# Patient Record
Sex: FEMALE | Race: WHITE | URBAN - METROPOLITAN AREA
[De-identification: names, ages, dates, MRNs, and addresses within clinical notes are randomized per-mention and may not be internally consistent; named-entity substitution may affect disease eponyms.]

---

## 2023-09-11 ENCOUNTER — TELEPHONE (OUTPATIENT)
Dept: OBGYN CLINIC | Facility: CLINIC | Age: 33
End: 2023-09-11

## 2023-09-11 NOTE — TELEPHONE ENCOUNTER
Patient is 19 weeks and 1 day pregnant and transferring care from [facility]. Patent has Medicaid [facility] that will take effect on 10/01/2023. Patient will send records through fax, please call at [phone],[email or web address].

## 2023-09-27 NOTE — TELEPHONE ENCOUNTER
ALBERTOF reviewed records and pt to come in to prenatal apt in a week.  Given to [address] staff to schedule

## 2023-10-04 ENCOUNTER — MED REC SCAN ONLY (OUTPATIENT)
Dept: OBGYN CLINIC | Facility: CLINIC | Age: 33
End: 2023-10-04

## 2023-10-09 ENCOUNTER — INITIAL PRENATAL (OUTPATIENT)
Dept: OBGYN CLINIC | Facility: CLINIC | Age: 33
End: 2023-10-09

## 2023-10-09 VITALS
BODY MASS INDEX: 29.27 KG/M2 | DIASTOLIC BLOOD PRESSURE: 66 MMHG | SYSTOLIC BLOOD PRESSURE: 100 MMHG | WEIGHT: 155 LBS | HEIGHT: 61 IN

## 2023-10-09 DIAGNOSIS — O09.33 LATE PRENATAL CARE AFFECTING PREGNANCY IN THIRD TRIMESTER: Primary | ICD-10-CM

## 2023-10-09 DIAGNOSIS — N92.6 MISSED MENSES: ICD-10-CM

## 2023-10-09 LAB
CONTROL LINE PRESENT WITH A CLEAR BACKGROUND (YES/NO): YES YES/NO
KIT LOT #: NORMAL NUMERIC
PREGNANCY TEST, URINE: POSITIVE

## 2023-10-09 NOTE — PROGRESS NOTES
Guadalupe from [place].  Had one ultrasound first trimester and per pt fhts were detected, pt was not told the due date  Pt had 1st tri screening normal , told a boy  Pt had pap last month normal per pt    Pnv taking  Ultrasound anatomy level 2 for late care  Labs w/ gtt

## 2023-10-10 ENCOUNTER — HOSPITAL ENCOUNTER (OUTPATIENT)
Dept: PERINATAL CARE | Facility: HOSPITAL | Age: 33
Discharge: HOME | End: 2023-10-10
Attending: OBSTETRICS & GYNECOLOGY
Payer: MEDICAID

## 2023-10-10 ENCOUNTER — TELEPHONE (OUTPATIENT)
Dept: OBGYN CLINIC | Facility: CLINIC | Age: 33
End: 2023-10-10

## 2023-10-10 VITALS
HEART RATE: 76 BPM | WEIGHT: 155 LBS | BODY MASS INDEX: 29 KG/M2 | DIASTOLIC BLOOD PRESSURE: 65 MMHG | SYSTOLIC BLOOD PRESSURE: 103 MMHG

## 2023-10-10 DIAGNOSIS — Z36.89 ENCOUNTER FOR FETAL ANATOMIC SURVEY: ICD-10-CM

## 2023-10-10 DIAGNOSIS — O09.32 LATE PRENATAL CARE AFFECTING PREGNANCY IN SECOND TRIMESTER: ICD-10-CM

## 2023-10-10 DIAGNOSIS — Z36.89 ENCOUNTER FOR FETAL ANATOMIC SURVEY: Primary | ICD-10-CM

## 2023-10-10 PROCEDURE — 76811 OB US DETAILED SNGL FETUS: CPT | Performed by: OBSTETRICS & GYNECOLOGY

## 2023-10-10 NOTE — TELEPHONE ENCOUNTER
Patient called in request a nurse to call patient would like to know if she need to fast for the Glu close test and fasting. Fetal ultrasound order need to be sent over . .for testing

## 2023-10-10 NOTE — PROGRESS NOTES
Pt for level 2 US  HX pt states low risk 1st tri testing  Denies pregnancy complaints  States active fetus

## 2023-10-10 NOTE — TELEPHONE ENCOUNTER
Patient verified name and     Reviewed instructions for 1 hr Glucose testing. Patient aware MFM order was placed for ultrasound. Number provided to schedule.

## 2023-10-11 ENCOUNTER — LAB ENCOUNTER (OUTPATIENT)
Dept: LAB | Facility: HOSPITAL | Age: 33
End: 2023-10-11
Attending: OBSTETRICS & GYNECOLOGY
Payer: MEDICAID

## 2023-10-11 DIAGNOSIS — O09.33 LATE PRENATAL CARE AFFECTING PREGNANCY IN THIRD TRIMESTER: ICD-10-CM

## 2023-10-11 LAB
ANTIBODY SCREEN: NEGATIVE
BASOPHILS # BLD AUTO: 0.02 X10(3) UL (ref 0–0.2)
BASOPHILS NFR BLD AUTO: 0.3 %
DEPRECATED RDW RBC AUTO: 44.1 FL (ref 35.1–46.3)
EOSINOPHIL # BLD AUTO: 0.17 X10(3) UL (ref 0–0.7)
EOSINOPHIL NFR BLD AUTO: 2.3 %
ERYTHROCYTE [DISTWIDTH] IN BLOOD BY AUTOMATED COUNT: 12.7 % (ref 11–15)
GLUCOSE 1H P GLC SERPL-MCNC: 115 MG/DL
HBV SURFACE AG SER-ACNC: <0.1 [IU]/L
HBV SURFACE AG SERPL QL IA: NONREACTIVE
HCT VFR BLD AUTO: 32.8 %
HCV AB SERPL QL IA: NONREACTIVE
HGB BLD-MCNC: 10.5 G/DL
IMM GRANULOCYTES # BLD AUTO: 0.03 X10(3) UL (ref 0–1)
IMM GRANULOCYTES NFR BLD: 0.4 %
LYMPHOCYTES # BLD AUTO: 1.08 X10(3) UL (ref 1–4)
LYMPHOCYTES NFR BLD AUTO: 14.6 %
MCH RBC QN AUTO: 30.2 PG (ref 26–34)
MCHC RBC AUTO-ENTMCNC: 32 G/DL (ref 31–37)
MCV RBC AUTO: 94.3 FL
MONOCYTES # BLD AUTO: 0.5 X10(3) UL (ref 0.1–1)
MONOCYTES NFR BLD AUTO: 6.7 %
NEUTROPHILS # BLD AUTO: 5.61 X10 (3) UL (ref 1.5–7.7)
NEUTROPHILS # BLD AUTO: 5.61 X10(3) UL (ref 1.5–7.7)
NEUTROPHILS NFR BLD AUTO: 75.7 %
PLATELET # BLD AUTO: 365 10(3)UL (ref 150–450)
RBC # BLD AUTO: 3.48 X10(6)UL
RH BLOOD TYPE: POSITIVE
RUBV IGG SER QL: POSITIVE
RUBV IGG SER-ACNC: 174.4 IU/ML (ref 10–?)
WBC # BLD AUTO: 7.4 X10(3) UL (ref 4–11)

## 2023-10-11 PROCEDURE — 87086 URINE CULTURE/COLONY COUNT: CPT

## 2023-10-11 PROCEDURE — 87491 CHLMYD TRACH DNA AMP PROBE: CPT

## 2023-10-11 PROCEDURE — 86850 RBC ANTIBODY SCREEN: CPT

## 2023-10-11 PROCEDURE — 87591 N.GONORRHOEAE DNA AMP PROB: CPT

## 2023-10-11 PROCEDURE — 87340 HEPATITIS B SURFACE AG IA: CPT

## 2023-10-11 PROCEDURE — 85025 COMPLETE CBC W/AUTO DIFF WBC: CPT

## 2023-10-11 PROCEDURE — 86762 RUBELLA ANTIBODY: CPT

## 2023-10-11 PROCEDURE — 87389 HIV-1 AG W/HIV-1&-2 AB AG IA: CPT

## 2023-10-11 PROCEDURE — 86900 BLOOD TYPING SEROLOGIC ABO: CPT

## 2023-10-11 PROCEDURE — 86780 TREPONEMA PALLIDUM: CPT

## 2023-10-11 PROCEDURE — 86803 HEPATITIS C AB TEST: CPT

## 2023-10-11 PROCEDURE — 86901 BLOOD TYPING SEROLOGIC RH(D): CPT

## 2023-10-11 PROCEDURE — 36415 COLL VENOUS BLD VENIPUNCTURE: CPT

## 2023-10-11 PROCEDURE — 82950 GLUCOSE TEST: CPT

## 2023-10-12 ENCOUNTER — OFFICE VISIT (OUTPATIENT)
Dept: INTERNAL MEDICINE CLINIC | Facility: CLINIC | Age: 33
End: 2023-10-12
Payer: MEDICAID

## 2023-10-12 VITALS
DIASTOLIC BLOOD PRESSURE: 56 MMHG | HEIGHT: 61 IN | HEART RATE: 82 BPM | TEMPERATURE: 100 F | BODY MASS INDEX: 29.27 KG/M2 | SYSTOLIC BLOOD PRESSURE: 91 MMHG | WEIGHT: 155 LBS | OXYGEN SATURATION: 98 %

## 2023-10-12 DIAGNOSIS — J06.9 URTI (ACUTE UPPER RESPIRATORY INFECTION): Primary | ICD-10-CM

## 2023-10-12 LAB
C TRACH DNA SPEC QL NAA+PROBE: NEGATIVE
N GONORRHOEA DNA SPEC QL NAA+PROBE: NEGATIVE

## 2023-10-12 PROCEDURE — 3074F SYST BP LT 130 MM HG: CPT | Performed by: INTERNAL MEDICINE

## 2023-10-12 PROCEDURE — 3008F BODY MASS INDEX DOCD: CPT | Performed by: INTERNAL MEDICINE

## 2023-10-12 PROCEDURE — 99204 OFFICE O/P NEW MOD 45 MIN: CPT | Performed by: INTERNAL MEDICINE

## 2023-10-12 PROCEDURE — 3078F DIAST BP <80 MM HG: CPT | Performed by: INTERNAL MEDICINE

## 2023-10-12 RX ORDER — IPRATROPIUM BROMIDE 42 UG/1
2 SPRAY, METERED NASAL 4 TIMES DAILY
Qty: 15 ML | Refills: 0 | Status: SHIPPED | OUTPATIENT
Start: 2023-10-12

## 2023-10-13 ENCOUNTER — TELEPHONE (OUTPATIENT)
Dept: INTERNAL MEDICINE CLINIC | Facility: CLINIC | Age: 33
End: 2023-10-13

## 2023-10-13 LAB — T PALLIDUM AB SER QL: NEGATIVE

## 2023-10-13 NOTE — TELEPHONE ENCOUNTER
First Call attempt. Left detailed message, Okay per DUSTIN, to reach out to pharmacy, they have prescription. If further questions, RN provided Office phone number with office hours. Advised check [employer] message also.            Outpatient Medication Detail     Disp Refills Start End    ipratropium 0.06 % Nasal Solution 15 mL 0 10/12/2023     Sig - Route: 2 sprays by Nasal route 4 (four) times daily. - Nasal    Sent to pharmacy as: Ipratropium Bromide 0.06 % Nasal Solution (Atrovent)    E-Prescribing Status: Receipt confirmed by pharmacy (10/13/2023  1:29 PM CDT)    Prior authorization: Approved      Associated Diagnoses    URTI (acute upper respiratory infection)  - [facility] [address] [place] [address],  [place] Celestin, [phone], [phone]

## 2023-10-13 NOTE — TELEPHONE ENCOUNTER
Prior authorization initiated for Medication:ipratropium 0.06 % Nasal Solution ,await 1-5 days for decision

## 2023-10-13 NOTE — TELEPHONE ENCOUNTER
Patient called stated had consultation yesterday and is in need for medication wondering why it shows it has arrived to the pharmacy and is unable to pick it up. Wanting to know what to do in the meanwhile. Current Outpatient Medications:     ipratropium 0.06 % Nasal Solution, 2 sprays by Nasal route 4 (four) times daily. , Disp: 15 mL, Rfl: 0

## 2023-10-14 NOTE — TELEPHONE ENCOUNTER
Prescription ordered by Dr. Pam Parisi [de-identified]  From  Abdon Cano RN To  [place] [place] and Delivered  10/13/2023  2:40 PM   Last Read in [address]  10/13/2023  4:19 PM by Zainab Kirby

## 2023-10-27 ENCOUNTER — TELEPHONE (OUTPATIENT)
Dept: OBGYN CLINIC | Facility: CLINIC | Age: 33
End: 2023-10-27

## 2023-10-27 NOTE — TELEPHONE ENCOUNTER
[place] to make November PN appt. Pt asking if November can be a virtual appt to discuss results?     Pt is thinking they will deliver in [address] advise

## 2023-10-27 NOTE — TELEPHONE ENCOUNTER
Patient verified name and     Patient 25w5d who transferred care to us is unable to make it for November appointment. Wants to know if it can be done over the phone. Discussed PN appts are done in the office. She will not be able to come into office until December. Last visit was 10/9. Discussed recommendations to be seen for routine prenatal care every 4 weeks which she would have needed a sooner appointment than November. Patient verbalized understanding and agreed. Wanted to keep appt scheduled on 11/10 for now.  [facility]

## 2023-11-17 ENCOUNTER — TELEPHONE (OUTPATIENT)
Dept: OBGYN CLINIC | Facility: CLINIC | Age: 33
End: 2023-11-17

## 2023-11-17 NOTE — TELEPHONE ENCOUNTER
Patient verified name and     Patient states she is feeling soreness around pelvic area. Patient states she has been feeling like she has been leaking fluid. States that she notices through out the day followed by clear mucus. Advised patient we would want her to be evaluated to r/o SROM. Patient states she is out of town and will not be back until the . She states she has no insurance out where she's currently at to go to ER. Enforced importance of ensuring she gets evaluated for this. Patient verbalized understanding and agreed.

## 2023-11-17 NOTE — TELEPHONE ENCOUNTER
Patient called, is experiencing pain in the groin area. Patient feels like whole body feels stiff and experiencing nausea and decreased appetite.

## 2023-11-27 ENCOUNTER — TELEPHONE (OUTPATIENT)
Dept: OBGYN CLINIC | Facility: CLINIC | Age: 33
End: 2023-11-27

## 2023-11-27 ENCOUNTER — ROUTINE PRENATAL (OUTPATIENT)
Dept: OBGYN CLINIC | Facility: CLINIC | Age: 33
End: 2023-11-27

## 2023-11-27 VITALS
HEART RATE: 91 BPM | WEIGHT: 164 LBS | SYSTOLIC BLOOD PRESSURE: 100 MMHG | BODY MASS INDEX: 31 KG/M2 | DIASTOLIC BLOOD PRESSURE: 67 MMHG

## 2023-11-27 DIAGNOSIS — Z34.83 ENCOUNTER FOR SUPERVISION OF OTHER NORMAL PREGNANCY IN THIRD TRIMESTER: Primary | ICD-10-CM

## 2023-11-27 LAB
APPEARANCE: CLEAR
BILIRUBIN: NEGATIVE
GLUCOSE (URINE DIPSTICK): NEGATIVE MG/DL
KETONES (URINE DIPSTICK): NEGATIVE MG/DL
MULTISTIX LOT#: ABNORMAL NUMERIC
NITRITE, URINE: NEGATIVE
OCCULT BLOOD: NEGATIVE
PH, URINE: 7 (ref 4.5–8)
PROTEIN (URINE DIPSTICK): NEGATIVE MG/DL
SPECIFIC GRAVITY: 1.01 (ref 1–1.03)
URINE-COLOR: YELLOW
UROBILINOGEN,SEMI-QN: 0.2 MG/DL (ref 0–1.9)

## 2023-11-27 PROCEDURE — 90471 IMMUNIZATION ADMIN: CPT | Performed by: OBSTETRICS & GYNECOLOGY

## 2023-11-27 PROCEDURE — 90715 TDAP VACCINE 7 YRS/> IM: CPT | Performed by: OBSTETRICS & GYNECOLOGY

## 2023-11-27 PROCEDURE — 99213 OFFICE O/P EST LOW 20 MIN: CPT | Performed by: OBSTETRICS & GYNECOLOGY

## 2023-11-27 PROCEDURE — 3078F DIAST BP <80 MM HG: CPT | Performed by: OBSTETRICS & GYNECOLOGY

## 2023-11-27 PROCEDURE — 81002 URINALYSIS NONAUTO W/O SCOPE: CPT | Performed by: OBSTETRICS & GYNECOLOGY

## 2023-11-27 PROCEDURE — 3074F SYST BP LT 130 MM HG: CPT | Performed by: OBSTETRICS & GYNECOLOGY

## 2023-11-27 RX ORDER — FAMOTIDINE 20 MG/1
40 TABLET, FILM COATED ORAL NIGHTLY
Qty: 60 TABLET | Refills: 5 | Status: SHIPPED | OUTPATIENT
Start: 2023-11-27

## 2023-11-27 RX ORDER — NITROFURANTOIN 25; 75 MG/1; MG/1
100 CAPSULE ORAL 2 TIMES DAILY
COMMUNITY
Start: 2023-10-21

## 2023-11-27 RX ORDER — FERROUS SULFATE 325(65) MG
325 TABLET ORAL EVERY OTHER DAY
Qty: 90 TABLET | Refills: 3 | Status: SHIPPED | OUTPATIENT
Start: 2023-11-27

## 2023-11-27 NOTE — TELEPHONE ENCOUNTER
Pt is at [facility] in [place] (on file). There is a discrepancy with prescription and won't fill until there is clarity. Medication is teraconazole 0.4 % Vaginal Cream. Please call.

## 2023-11-27 NOTE — TELEPHONE ENCOUNTER
Clarification provided to pharmacy regarding directions for teraconazole vaginal cream: Place 1 applicator vaginally nightly for 7 days.

## 2023-12-11 ENCOUNTER — ROUTINE PRENATAL (OUTPATIENT)
Dept: OBGYN CLINIC | Facility: CLINIC | Age: 33
End: 2023-12-11

## 2023-12-11 VITALS
DIASTOLIC BLOOD PRESSURE: 73 MMHG | HEART RATE: 105 BPM | BODY MASS INDEX: 32 KG/M2 | SYSTOLIC BLOOD PRESSURE: 104 MMHG | WEIGHT: 168.19 LBS

## 2023-12-11 DIAGNOSIS — Z34.83 ENCOUNTER FOR SUPERVISION OF OTHER NORMAL PREGNANCY IN THIRD TRIMESTER: Primary | ICD-10-CM

## 2023-12-11 LAB
APPEARANCE: CLEAR
BILIRUBIN: NEGATIVE
GLUCOSE (URINE DIPSTICK): NEGATIVE MG/DL
KETONES (URINE DIPSTICK): NEGATIVE MG/DL
MULTISTIX LOT#: ABNORMAL NUMERIC
NITRITE, URINE: NEGATIVE
OCCULT BLOOD: NEGATIVE
PH, URINE: 5.5 (ref 4.5–8)
PROTEIN (URINE DIPSTICK): NEGATIVE MG/DL
SPECIFIC GRAVITY: 1 (ref 1–1.03)
URINE-COLOR: YELLOW
UROBILINOGEN,SEMI-QN: 0.2 MG/DL (ref 0–1.9)

## 2023-12-11 RX ORDER — PRENATAL VIT/IRON FUM/FOLIC AC 27MG-0.8MG
1 TABLET ORAL DAILY
COMMUNITY

## 2023-12-12 ENCOUNTER — LAB ENCOUNTER (OUTPATIENT)
Dept: LAB | Facility: HOSPITAL | Age: 33
End: 2023-12-12
Attending: OBSTETRICS & GYNECOLOGY
Payer: MEDICAID

## 2023-12-12 DIAGNOSIS — Z34.83 ENCOUNTER FOR SUPERVISION OF OTHER NORMAL PREGNANCY IN THIRD TRIMESTER: ICD-10-CM

## 2023-12-12 LAB
BASOPHILS # BLD AUTO: 0.02 X10(3) UL (ref 0–0.2)
BASOPHILS NFR BLD AUTO: 0.3 %
DEPRECATED HBV CORE AB SER IA-ACNC: 21.2 NG/ML
DEPRECATED RDW RBC AUTO: 45.2 FL (ref 35.1–46.3)
EOSINOPHIL # BLD AUTO: 0.18 X10(3) UL (ref 0–0.7)
EOSINOPHIL NFR BLD AUTO: 2.7 %
ERYTHROCYTE [DISTWIDTH] IN BLOOD BY AUTOMATED COUNT: 14.8 % (ref 11–15)
GLUCOSE 1H P GLC SERPL-MCNC: 113 MG/DL
HCT VFR BLD AUTO: 31.3 %
HGB BLD-MCNC: 10.1 G/DL
IMM GRANULOCYTES # BLD AUTO: 0.05 X10(3) UL (ref 0–1)
IMM GRANULOCYTES NFR BLD: 0.7 %
LYMPHOCYTES # BLD AUTO: 1.24 X10(3) UL (ref 1–4)
LYMPHOCYTES NFR BLD AUTO: 18.3 %
MCH RBC QN AUTO: 27.7 PG (ref 26–34)
MCHC RBC AUTO-ENTMCNC: 32.3 G/DL (ref 31–37)
MCV RBC AUTO: 85.8 FL
MONOCYTES # BLD AUTO: 0.42 X10(3) UL (ref 0.1–1)
MONOCYTES NFR BLD AUTO: 6.2 %
NEUTROPHILS # BLD AUTO: 4.87 X10 (3) UL (ref 1.5–7.7)
NEUTROPHILS # BLD AUTO: 4.87 X10(3) UL (ref 1.5–7.7)
NEUTROPHILS NFR BLD AUTO: 71.8 %
PLATELET # BLD AUTO: 320 10(3)UL (ref 150–450)
RBC # BLD AUTO: 3.65 X10(6)UL
T PALLIDUM AB SER QL IA: NONREACTIVE
WBC # BLD AUTO: 6.8 X10(3) UL (ref 4–11)

## 2023-12-12 PROCEDURE — 87389 HIV-1 AG W/HIV-1&-2 AB AG IA: CPT

## 2023-12-12 PROCEDURE — 36415 COLL VENOUS BLD VENIPUNCTURE: CPT

## 2023-12-12 PROCEDURE — 86780 TREPONEMA PALLIDUM: CPT

## 2023-12-12 PROCEDURE — 85025 COMPLETE CBC W/AUTO DIFF WBC: CPT

## 2023-12-12 PROCEDURE — 82728 ASSAY OF FERRITIN: CPT

## 2023-12-12 PROCEDURE — 82950 GLUCOSE TEST: CPT

## 2023-12-27 ENCOUNTER — ROUTINE PRENATAL (OUTPATIENT)
Dept: OBGYN CLINIC | Facility: CLINIC | Age: 33
End: 2023-12-27

## 2023-12-27 VITALS — SYSTOLIC BLOOD PRESSURE: 100 MMHG | WEIGHT: 172.81 LBS | DIASTOLIC BLOOD PRESSURE: 58 MMHG | BODY MASS INDEX: 33 KG/M2

## 2023-12-27 DIAGNOSIS — Z13.89 SCREENING FOR BLOOD OR PROTEIN IN URINE: Primary | ICD-10-CM

## 2023-12-27 DIAGNOSIS — Z34.83 ENCOUNTER FOR SUPERVISION OF OTHER NORMAL PREGNANCY IN THIRD TRIMESTER: ICD-10-CM

## 2023-12-27 LAB
APPEARANCE: CLEAR
BILIRUBIN: NEGATIVE
GLUCOSE (URINE DIPSTICK): NEGATIVE MG/DL
KETONES (URINE DIPSTICK): NEGATIVE MG/DL
LEUKOCYTES: NEGATIVE
MULTISTIX LOT#: NORMAL NUMERIC
NITRITE, URINE: NEGATIVE
OCCULT BLOOD: NEGATIVE
PH, URINE: 6.5 (ref 4.5–8)
PROTEIN (URINE DIPSTICK): NEGATIVE MG/DL
SPECIFIC GRAVITY: 1.01 (ref 1–1.03)
URINE-COLOR: YELLOW
UROBILINOGEN,SEMI-QN: 0.2 MG/DL (ref 0–1.9)

## 2023-12-27 PROCEDURE — 3078F DIAST BP <80 MM HG: CPT | Performed by: OBSTETRICS & GYNECOLOGY

## 2023-12-27 PROCEDURE — 3074F SYST BP LT 130 MM HG: CPT | Performed by: OBSTETRICS & GYNECOLOGY

## 2023-12-27 PROCEDURE — 81002 URINALYSIS NONAUTO W/O SCOPE: CPT | Performed by: OBSTETRICS & GYNECOLOGY

## 2023-12-27 PROCEDURE — 99213 OFFICE O/P EST LOW 20 MIN: CPT | Performed by: OBSTETRICS & GYNECOLOGY

## 2023-12-27 NOTE — PROGRESS NOTES
Patient reports pain in left side for 3 days, feeling more tired. Pre-eclamptic precautions given. Kick Counts reviewed.   CBC, Ferritin, HIV, RPR labs ordered    Lab Results   Component Value Date    GLU1OB 113 12/12/2023

## 2024-01-12 ENCOUNTER — ROUTINE PRENATAL (OUTPATIENT)
Dept: OBGYN CLINIC | Facility: CLINIC | Age: 34
End: 2024-01-12

## 2024-01-12 VITALS
SYSTOLIC BLOOD PRESSURE: 113 MMHG | BODY MASS INDEX: 33 KG/M2 | WEIGHT: 176 LBS | HEART RATE: 82 BPM | DIASTOLIC BLOOD PRESSURE: 48 MMHG

## 2024-01-12 DIAGNOSIS — Z34.83 ENCOUNTER FOR SUPERVISION OF OTHER NORMAL PREGNANCY IN THIRD TRIMESTER: Primary | ICD-10-CM

## 2024-01-12 LAB
BILIRUBIN: NEGATIVE
GLUCOSE (URINE DIPSTICK): NEGATIVE MG/DL
KETONES (URINE DIPSTICK): NEGATIVE MG/DL
MULTISTIX LOT#: ABNORMAL NUMERIC
NITRITE, URINE: NEGATIVE
OCCULT BLOOD: NEGATIVE
PH, URINE: 7 (ref 4.5–8)
PROTEIN (URINE DIPSTICK): NEGATIVE MG/DL
SPECIFIC GRAVITY: 1.01 (ref 1–1.03)
URINE-COLOR: YELLOW
UROBILINOGEN,SEMI-QN: 0.2 MG/DL (ref 0–1.9)

## 2024-01-12 PROCEDURE — 87081 CULTURE SCREEN ONLY: CPT | Performed by: STUDENT IN AN ORGANIZED HEALTH CARE EDUCATION/TRAINING PROGRAM

## 2024-01-12 PROCEDURE — 87150 DNA/RNA AMPLIFIED PROBE: CPT | Performed by: STUDENT IN AN ORGANIZED HEALTH CARE EDUCATION/TRAINING PROGRAM

## 2024-01-12 NOTE — PROGRESS NOTES
[facility]  Obstetrics and Gynecology  Prenatal Visit  Bárbara Gonzalez PA-C    HPI   Jeannie Horton is a 33 year old.o.  36w5d weeks.  Pt is here for routine prenatal visit. No complaints or concerns.   Denies any regular uterine contractions, spontaneous rupture membranes or vaginal bleeding.  Patient feeling normal fetal movement.    OB History     OB History    Para Term  AB Living   1             SAB IAB Ectopic Multiple Live Births                  # Outcome Date GA Lbr Dom/2nd Weight Sex Delivery Anes PTL Lv   1 Current              Medications     Current Outpatient Medications   Medication Sig Dispense Refill    Prenatal 27-0.8 MG Oral Tab Take 1 tablet by mouth daily.      Ferrous Sulfate 325 (65 Fe) MG Oral Tab Take 1 tablet (325 mg total) by mouth every other day. 90 tablet 3    nitrofurantoin monohydrate macro 100 MG Oral Cap Take 1 capsule (100 mg total) by mouth 2 (two) times daily. (Patient not taking: Reported on 2023)      teraconazole 0.4 % Vaginal Cream Place 7 applicators vaginally nightly. (Patient not taking: Reported on 2023) 7 each 0    famotidine 20 MG Oral Tab Take 2 tablets (40 mg total) by mouth at bedtime. (Patient not taking: Reported on 2023) 60 tablet 5    ipratropium 0.06 % Nasal Solution 2 sprays by Nasal route 4 (four) times daily. (Patient not taking: Reported on 2023) 15 mL 0     Exam   /48   Pulse 82   Wt 176 lb (79.8 kg)   LMP 2023 (Exact Date)   BMI 33.25 kg/m²   FH: 36  FHTs: 145  Assessment   Jeannie is a 33 year old female  with viable IUP at 36w5d weeks.      ICD-10-CM    1. Encounter for supervision of other normal pregnancy in third trimester  Z34.83 POC Urinalysis, Manual Dip without microscopy [70298]        Plan   - GBBS collected  - RTC in 1 week  BÁRBARA GONZALEZ PA-C  11:28 AM  2024

## 2024-01-13 LAB — GROUP B STREP BY PCR FOR PCR OVT: POSITIVE

## 2024-01-15 ENCOUNTER — TELEPHONE (OUTPATIENT)
Dept: OBGYN CLINIC | Facility: CLINIC | Age: 34
End: 2024-01-15

## 2024-01-15 NOTE — TELEPHONE ENCOUNTER
Patient called, would like to know if Pt can receive RSV vaccine . Would also like to know at how many weeks can PT get Vaccine. Please call.

## 2024-01-15 NOTE — TELEPHONE ENCOUNTER
Patient verified name and     Patient aware of guidelines for Rsv from 32-36 weeks. Patient states she was unaware of time frame. Patient has appt on , discussed she can also touch base with provider if they are okay with her getting outside of timeframe. Verbalized understanding and agreed.

## 2024-01-17 ENCOUNTER — TELEPHONE (OUTPATIENT)
Dept: OBGYN CLINIC | Facility: CLINIC | Age: 34
End: 2024-01-17

## 2024-01-19 ENCOUNTER — ROUTINE PRENATAL (OUTPATIENT)
Dept: OBGYN CLINIC | Facility: CLINIC | Age: 34
End: 2024-01-19

## 2024-01-19 VITALS
BODY MASS INDEX: 33 KG/M2 | WEIGHT: 177 LBS | SYSTOLIC BLOOD PRESSURE: 115 MMHG | DIASTOLIC BLOOD PRESSURE: 73 MMHG | HEART RATE: 85 BPM

## 2024-01-19 DIAGNOSIS — N89.8 VAGINAL DISCHARGE: ICD-10-CM

## 2024-01-19 DIAGNOSIS — Z34.83 ENCOUNTER FOR SUPERVISION OF OTHER NORMAL PREGNANCY IN THIRD TRIMESTER: Primary | ICD-10-CM

## 2024-01-19 LAB
APPEARANCE: CLEAR
BILIRUBIN: NEGATIVE
GLUCOSE (URINE DIPSTICK): NEGATIVE MG/DL
KETONES (URINE DIPSTICK): NEGATIVE MG/DL
MULTISTIX LOT#: ABNORMAL NUMERIC
NITRITE, URINE: NEGATIVE
PH, URINE: 7 (ref 4.5–8)
PROTEIN (URINE DIPSTICK): NEGATIVE MG/DL
SPECIFIC GRAVITY: 1.02 (ref 1–1.03)
URINE-COLOR: YELLOW
UROBILINOGEN,SEMI-QN: 0.2 MG/DL (ref 0–1.9)

## 2024-01-19 PROCEDURE — 81514 NFCT DS BV&VAGINITIS DNA ALG: CPT | Performed by: STUDENT IN AN ORGANIZED HEALTH CARE EDUCATION/TRAINING PROGRAM

## 2024-01-19 NOTE — PROGRESS NOTES
[facility]  Obstetrics and Gynecology  Prenatal Visit  Mira Fink PA-C    HPI   Jeannie Horton is a 33 year old.o.  37w5d weeks.    Pt is here for routine prenatal visit. No complaints or concerns.   Denies any regular uterine contractions, spontaneous rupture membranes or vaginal bleeding.  Patient feeling normal fetal movement.    Is reporting vaginal itching. NO abnormal vaginal discharge.    OB History     OB History    Para Term  AB Living   1             SAB IAB Ectopic Multiple Live Births                  # Outcome Date GA Lbr Dom/2nd Weight Sex Delivery Anes PTL Lv   1 Current              Medications     Current Outpatient Medications   Medication Sig Dispense Refill    Prenatal 27-0.8 MG Oral Tab Take 1 tablet by mouth daily.      Ferrous Sulfate 325 (65 Fe) MG Oral Tab Take 1 tablet (325 mg total) by mouth every other day. 90 tablet 3    nitrofurantoin monohydrate macro 100 MG Oral Cap Take 1 capsule (100 mg total) by mouth 2 (two) times daily. (Patient not taking: Reported on 2023)      teraconazole 0.4 % Vaginal Cream Place 7 applicators vaginally nightly. (Patient not taking: Reported on 2023) 7 each 0    famotidine 20 MG Oral Tab Take 2 tablets (40 mg total) by mouth at bedtime. (Patient not taking: Reported on 2023) 60 tablet 5    ipratropium 0.06 % Nasal Solution 2 sprays by Nasal route 4 (four) times daily. (Patient not taking: Reported on 2023) 15 mL 0     Exam   /73   Pulse 85   Wt 177 lb (80.3 kg)   LMP 2023 (Exact Date)   BMI 33.44 kg/m²   FH: 47  FHTs: 145    Assessment   Jeannie is a 33 year old female  with viable IUP at 37w5d weeks.      ICD-10-CM    1. Encounter for supervision of other normal pregnancy in third trimester  Z34.83 POC Urinalysis, Automated Dip without microscopy (PCA and EMMG ONLY) [59264]        Plan   - Pt asked about RSV vaccine, too late to receive as it is not enough time for antibodies to cross  placenta. Baby can receive antibodies postpartum.  - Vaginosis panel obtained  - RTC in 1 week    BÁRBARA GONZALEZ PA-C  10:50 AM  1/19/2024

## 2024-01-20 LAB
BV BACTERIA DNA VAG QL NAA+PROBE: NEGATIVE
C GLABRATA DNA VAG QL NAA+PROBE: NEGATIVE
C KRUSEI DNA VAG QL NAA+PROBE: NEGATIVE
CANDIDA DNA VAG QL NAA+PROBE: POSITIVE
T VAGINALIS DNA VAG QL NAA+PROBE: NEGATIVE

## 2024-01-29 ENCOUNTER — ROUTINE PRENATAL (OUTPATIENT)
Dept: OBGYN CLINIC | Facility: CLINIC | Age: 34
End: 2024-01-29

## 2024-01-29 VITALS — WEIGHT: 176.81 LBS | DIASTOLIC BLOOD PRESSURE: 62 MMHG | SYSTOLIC BLOOD PRESSURE: 104 MMHG | BODY MASS INDEX: 33 KG/M2

## 2024-01-29 DIAGNOSIS — Z34.83 ENCOUNTER FOR SUPERVISION OF OTHER NORMAL PREGNANCY IN THIRD TRIMESTER: Primary | ICD-10-CM

## 2024-01-29 DIAGNOSIS — Z13.89 SCREENING FOR BLOOD OR PROTEIN IN URINE: ICD-10-CM

## 2024-01-29 LAB
APPEARANCE: CLEAR
BILIRUBIN: NEGATIVE
GLUCOSE (URINE DIPSTICK): NEGATIVE MG/DL
KETONES (URINE DIPSTICK): NEGATIVE MG/DL
MULTISTIX LOT#: ABNORMAL NUMERIC
NITRITE, URINE: NEGATIVE
OCCULT BLOOD: NEGATIVE
PH, URINE: 6.5 (ref 4.5–8)
PROTEIN (URINE DIPSTICK): NEGATIVE MG/DL
SPECIFIC GRAVITY: 1.01 (ref 1–1.03)
URINE-COLOR: CLEAR
UROBILINOGEN,SEMI-QN: 0.2 MG/DL (ref 0–1.9)

## 2024-01-29 NOTE — PROGRESS NOTES
Kick Counts and Labor precautions reviewed.    Discussed post dates and need for NST and appt next week   Understands if no labor 41 week IOL      Lab Results   Component Value Date    GBS Positive (A) 01/12/2024

## 2024-01-31 ENCOUNTER — TELEPHONE (OUTPATIENT)
Dept: OBGYN CLINIC | Facility: CLINIC | Age: 34
End: 2024-01-31

## 2024-02-04 ENCOUNTER — HOSPITAL ENCOUNTER (OUTPATIENT)
Facility: HOSPITAL | Age: 34
Discharge: HOME | End: 2024-02-04
Attending: OBSTETRICS & GYNECOLOGY | Admitting: OBSTETRICS & GYNECOLOGY
Payer: COMMERCIAL

## 2024-02-04 ENCOUNTER — NST DOCUMENTATION (OUTPATIENT)
Dept: OBGYN CLINIC | Facility: CLINIC | Age: 34
End: 2024-02-04

## 2024-02-04 ENCOUNTER — TELEPHONE (OUTPATIENT)
Dept: OBGYN CLINIC | Facility: CLINIC | Age: 34
End: 2024-02-04

## 2024-02-04 ENCOUNTER — APPOINTMENT (OUTPATIENT)
Dept: OBGYN CLINIC | Facility: HOSPITAL | Age: 34
End: 2024-02-04
Attending: OBSTETRICS & GYNECOLOGY
Payer: COMMERCIAL

## 2024-02-04 VITALS — SYSTOLIC BLOOD PRESSURE: 121 MMHG | DIASTOLIC BLOOD PRESSURE: 75 MMHG | HEART RATE: 110 BPM

## 2024-02-04 DIAGNOSIS — Z34.90 PREGNANCY: ICD-10-CM

## 2024-02-04 DIAGNOSIS — O48.0 POST-TERM PREGNANCY, 40-42 WEEKS OF GESTATION: Primary | ICD-10-CM

## 2024-02-04 PROCEDURE — 59025 FETAL NON-STRESS TEST: CPT | Performed by: OBSTETRICS & GYNECOLOGY

## 2024-02-04 PROCEDURE — 59025 FETAL NON-STRESS TEST: CPT

## 2024-02-04 NOTE — NST
Nonstress Test   Patient: Jeannie Horton    Gestation: 40w0d    Diagnosis from order:    ICD-10-CM    1. Post-term pregnancy, 40-42 weeks of gestation [O48.0]  O48.0                NST:        2024   NST DOCUMENTATION   Variability 6-25 BPM   Accelerations Yes   Decelerations None   Baseline 135 BPM   Uterine Irritability No   Contractions Not present   Acoustic Stimulator No   Nonstress Test Interpretation Reactive   Nonstress Test Second Interpretation Reactive   FHR Category Category I   Comments  40w0d; NST for PD   NST Completed by Sanya COLEMAN   Disposition Home    Testing Plan Weekly NST   Provider Notified Ton MILES         I agree with the above evaluation. NST completed.  Rupinder Camilo MD  2024  11:57 AM

## 2024-02-04 NOTE — PROGRESS NOTES
Pt is a 33 year old female admitted to TR1/TR1-A.     Chief Complaint   Patient presents with    Non-stress Test     PD      Pt is  40w0d intra-uterine pregnancy.  History obtained, consents signed. Oriented to room, staff, and plan of care.

## 2024-02-04 NOTE — H&P
[facility]    History and Physical Examination      Subjective   Chief Complaint:  NST post dates     HISTORY OF PRESENT ILLNESS:        Patient is a 33 year old y/o   @ 40w0d weeks presents for NST post dates . She notes + fetal movement, - vaginal bleeding, and  - ROM. Her prenatal course was uncomplicated . Her prenatal care is up to date and reviewed. Patient's GBS is  Positive     Lab Results   Component Value Date    GBS Positive (A) 2024           Past Medical History:   Diagnosis Date    Anemia        Past Surgical History:   Procedure Laterality Date    WISDOM TEETH REMOVED         OB History    Para Term  AB Living   1 0 0 0 0 0   SAB IAB Ectopic Multiple Live Births   0 0 0 0 0         No current outpatient medications on file.    No Known Allergies    Social History     Socioeconomic History    Marital status: Single     Spouse name: Not on file    Number of children: Not on file    Years of education: Not on file    Highest education level: Not on file   Occupational History    Not on file   Tobacco Use    Smoking status: Never    Smokeless tobacco: Never   Substance and Sexual Activity    Alcohol use: Not Currently     Alcohol/week: 1.0 standard drink of alcohol     Types: 1 Standard drinks or equivalent per week     Comment: socially    Drug use: Never    Sexual activity: Not on file   Other Topics Concern    Not on file   Social History Narrative    Not on file     Social Determinants of Health     Financial Resource Strain: Low Risk  (2023)    Financial Resource Strain     Difficulty of Paying Living Expenses: Somewhat hard     Med Affordability: No   Food Insecurity: No Food Insecurity (2023)    Food Insecurity     Food Insecurity: Never true   Transportation Needs: No Transportation Needs (2023)    Transportation Needs     Lack of Transportation: No   Stress: No Stress Concern Present (2023)    Stress     Feeling of Stress : No    Housing Stability: Low Risk  (11/27/2023)    Housing Stability     Housing Instability: No     Housing Instability Emergency: Not on file         History reviewed. No pertinent family history.        Prior to Admission medications    Medication Sig Start Date End Date Taking? Authorizing Provider   Prenatal 27-0.8 MG Oral Tab Take 1 tablet by mouth daily.   Yes External/Patient, Reported   Ferrous Sulfate 325 (65 Fe) MG Oral Tab Take 1 tablet (325 mg total) by mouth every other day. 11/27/23  Yes Petey Pack MD   teraconazole 0.4 % Vaginal Cream Place 7 applicators vaginally nightly.  Patient not taking: Reported on 12/11/2023 11/27/23   Petey Pack MD   famotidine 20 MG Oral Tab Take 2 tablets (40 mg total) by mouth at bedtime.  Patient not taking: Reported on 12/11/2023 11/27/23   Petey Pack MD   ipratropium 0.06 % Nasal Solution 2 sprays by Nasal route 4 (four) times daily.  Patient not taking: Reported on 12/27/2023 10/12/23   Donna Grimes MD           Objective       ROS   Constitutional: Negative.  Negative for chills and fever.   Respiratory: Negative.  Negative for shortness of breath.    Cardiovascular: Negative for chest pain and palpitations.   Gastrointestinal: Negative for diarrhea, nausea and vomiting.   Genitourinary: Negative.  Negative for dysuria.   Neurological: Negative for dizziness.       Vitals:    Pulse: 110  BP: 121/75     Physical Exam   Constitutional: She appears well-developed and well-nourished.   Cardiovascular: Normal rate.   Pulmonary/Chest: Effort normal.   Abdominal: Soft.   Genitourinary: Uterus normal.   Neurological: She is alert.   Skin: Skin is warm.   Psychiatric: She has a normal mood and affect.      .    EFM:   Baseline 140, + Accels, - Decels, Mod LT  Variability    Imogene:  CTX irregular     Ultrasound JOSE 5.3 DVP and VTX Grade 3 placenta       Lab Results   Component Value Date    ABO BLOOD TYPE O 10/11/2023    RH BLOOD TYPE Positive 10/11/2023     HGB 10.1 (L) 12/12/2023    .0 12/12/2023    HCT 31.3 (L) 12/12/2023    Rubella IgG Positive 10/11/2023    Treponemal Antibodies Nonreactive 12/12/2023    HIV Antigen Antibody Combo Non-Reactive 12/12/2023            ASSESSMENT AND PLAN:      Principal Problem:    Post-dates pregnancy        Patient seen for post dates   [email or web address] NST Reactive   Good FM noted, fetal monitoring strip reviewed and reassuring.  Plan IOL on Tuesday pm with cytotec.     Rupinder Camilo MD

## 2024-02-05 ENCOUNTER — TELEPHONE (OUTPATIENT)
Dept: OBGYN UNIT | Facility: HOSPITAL | Age: 34
End: 2024-02-05

## 2024-02-06 ENCOUNTER — HOSPITAL ENCOUNTER (INPATIENT)
Facility: HOSPITAL | Age: 34
LOS: 4 days | Discharge: HOME | End: 2024-02-10
Attending: OBSTETRICS & GYNECOLOGY | Admitting: OBSTETRICS & GYNECOLOGY
Payer: COMMERCIAL

## 2024-02-06 ENCOUNTER — APPOINTMENT (OUTPATIENT)
Dept: OBGYN CLINIC | Facility: HOSPITAL | Age: 34
End: 2024-02-06
Attending: OBSTETRICS & GYNECOLOGY
Payer: COMMERCIAL

## 2024-02-06 PROBLEM — Z34.90 PREGNANCY (HCC): Status: ACTIVE | Noted: 2024-02-06

## 2024-02-06 PROBLEM — Z34.90 PREGNANCY: Status: ACTIVE | Noted: 2024-02-06

## 2024-02-06 LAB
ANTIBODY SCREEN: NEGATIVE
BASOPHILS # BLD AUTO: 0.02 X10(3) UL (ref 0–0.2)
BASOPHILS NFR BLD AUTO: 0.3 %
DEPRECATED RDW RBC AUTO: 56.1 FL (ref 35.1–46.3)
EOSINOPHIL # BLD AUTO: 0.12 X10(3) UL (ref 0–0.7)
EOSINOPHIL NFR BLD AUTO: 1.9 %
ERYTHROCYTE [DISTWIDTH] IN BLOOD BY AUTOMATED COUNT: 18.2 % (ref 11–15)
HCT VFR BLD AUTO: 36.5 %
HGB BLD-MCNC: 11.9 G/DL
IMM GRANULOCYTES # BLD AUTO: 0.02 X10(3) UL (ref 0–1)
IMM GRANULOCYTES NFR BLD: 0.3 %
LYMPHOCYTES # BLD AUTO: 1.45 X10(3) UL (ref 1–4)
LYMPHOCYTES NFR BLD AUTO: 23.3 %
MCH RBC QN AUTO: 27.7 PG (ref 26–34)
MCHC RBC AUTO-ENTMCNC: 32.6 G/DL (ref 31–37)
MCV RBC AUTO: 84.9 FL
MONOCYTES # BLD AUTO: 0.47 X10(3) UL (ref 0.1–1)
MONOCYTES NFR BLD AUTO: 7.6 %
NEUTROPHILS # BLD AUTO: 4.13 X10 (3) UL (ref 1.5–7.7)
NEUTROPHILS # BLD AUTO: 4.13 X10(3) UL (ref 1.5–7.7)
NEUTROPHILS NFR BLD AUTO: 66.6 %
PLATELET # BLD AUTO: 275 10(3)UL (ref 150–450)
RBC # BLD AUTO: 4.3 X10(6)UL
RH BLOOD TYPE: POSITIVE
WBC # BLD AUTO: 6.2 X10(3) UL (ref 4–11)

## 2024-02-06 PROCEDURE — 3E033VJ INTRODUCTION OF OTHER HORMONE INTO PERIPHERAL VEIN, PERCUTANEOUS APPROACH: ICD-10-PCS | Performed by: OBSTETRICS & GYNECOLOGY

## 2024-02-06 RX ORDER — DEXTROSE, SODIUM CHLORIDE, SODIUM LACTATE, POTASSIUM CHLORIDE, AND CALCIUM CHLORIDE 5; .6; .31; .03; .02 G/100ML; G/100ML; G/100ML; G/100ML; G/100ML
INJECTION, SOLUTION INTRAVENOUS AS NEEDED
Status: DISCONTINUED | OUTPATIENT
Start: 2024-02-06 | End: 2024-02-07 | Stop reason: HOSPADM

## 2024-02-06 RX ORDER — SODIUM CHLORIDE, SODIUM LACTATE, POTASSIUM CHLORIDE, CALCIUM CHLORIDE 600; 310; 30; 20 MG/100ML; MG/100ML; MG/100ML; MG/100ML
INJECTION, SOLUTION INTRAVENOUS CONTINUOUS
Status: DISCONTINUED | OUTPATIENT
Start: 2024-02-06 | End: 2024-02-07 | Stop reason: HOSPADM

## 2024-02-06 RX ORDER — LIDOCAINE HYDROCHLORIDE 10 MG/ML
30 INJECTION, SOLUTION EPIDURAL; INFILTRATION; INTRACAUDAL; PERINEURAL ONCE
Status: DISCONTINUED | OUTPATIENT
Start: 2024-02-06 | End: 2024-02-07 | Stop reason: HOSPADM

## 2024-02-06 RX ORDER — ACETAMINOPHEN 500 MG
500 TABLET ORAL EVERY 6 HOURS PRN
Status: DISCONTINUED | OUTPATIENT
Start: 2024-02-06 | End: 2024-02-07 | Stop reason: HOSPADM

## 2024-02-06 RX ORDER — TERBUTALINE SULFATE 1 MG/ML
0.25 INJECTION, SOLUTION SUBCUTANEOUS AS NEEDED
Status: DISCONTINUED | OUTPATIENT
Start: 2024-02-06 | End: 2024-02-07 | Stop reason: HOSPADM

## 2024-02-06 RX ORDER — CITRIC ACID/SODIUM CITRATE 334-500MG
30 SOLUTION, ORAL ORAL AS NEEDED
Status: COMPLETED | OUTPATIENT
Start: 2024-02-06 | End: 2024-02-07

## 2024-02-06 RX ORDER — ONDANSETRON 2 MG/ML
4 INJECTION INTRAMUSCULAR; INTRAVENOUS EVERY 6 HOURS PRN
Status: DISCONTINUED | OUTPATIENT
Start: 2024-02-06 | End: 2024-02-07 | Stop reason: HOSPADM

## 2024-02-06 RX ORDER — ACETAMINOPHEN 500 MG
1000 TABLET ORAL EVERY 6 HOURS PRN
Status: DISCONTINUED | OUTPATIENT
Start: 2024-02-06 | End: 2024-02-07 | Stop reason: HOSPADM

## 2024-02-06 RX ORDER — IBUPROFEN 600 MG/1
600 TABLET ORAL ONCE AS NEEDED
Status: DISCONTINUED | OUTPATIENT
Start: 2024-02-06 | End: 2024-02-07 | Stop reason: HOSPADM

## 2024-02-07 ENCOUNTER — ANESTHESIA (OUTPATIENT)
Dept: OBGYN UNIT | Facility: HOSPITAL | Age: 34
End: 2024-02-07
Payer: COMMERCIAL

## 2024-02-07 ENCOUNTER — ANESTHESIA EVENT (OUTPATIENT)
Dept: OBGYN UNIT | Facility: HOSPITAL | Age: 34
End: 2024-02-07
Payer: COMMERCIAL

## 2024-02-07 PROCEDURE — 59514 CESAREAN DELIVERY ONLY: CPT | Performed by: OBSTETRICS & GYNECOLOGY

## 2024-02-07 RX ORDER — BUPIVACAINE HYDROCHLORIDE 2.5 MG/ML
20 INJECTION, SOLUTION EPIDURAL; INFILTRATION; INTRACAUDAL ONCE
Status: DISCONTINUED | OUTPATIENT
Start: 2024-02-07 | End: 2024-02-07

## 2024-02-07 RX ORDER — NALBUPHINE HYDROCHLORIDE 10 MG/ML
2.5 INJECTION, SOLUTION INTRAMUSCULAR; INTRAVENOUS; SUBCUTANEOUS
Status: DISCONTINUED | OUTPATIENT
Start: 2024-02-07 | End: 2024-02-07

## 2024-02-07 RX ORDER — PROCHLORPERAZINE EDISYLATE 5 MG/ML
5 INJECTION INTRAMUSCULAR; INTRAVENOUS ONCE AS NEEDED
Status: ACTIVE | OUTPATIENT
Start: 2024-02-07 | End: 2024-02-07

## 2024-02-07 RX ORDER — NALBUPHINE HYDROCHLORIDE 10 MG/ML
2.5 INJECTION, SOLUTION INTRAMUSCULAR; INTRAVENOUS; SUBCUTANEOUS EVERY 4 HOURS PRN
Status: ACTIVE | OUTPATIENT
Start: 2024-02-07 | End: 2024-02-08

## 2024-02-07 RX ORDER — DIPHENHYDRAMINE HYDROCHLORIDE 50 MG/ML
12.5 INJECTION INTRAMUSCULAR; INTRAVENOUS EVERY 4 HOURS PRN
Status: ACTIVE | OUTPATIENT
Start: 2024-02-07 | End: 2024-02-08

## 2024-02-07 RX ORDER — LIDOCAINE HYDROCHLORIDE 10 MG/ML
INJECTION, SOLUTION EPIDURAL; INFILTRATION; INTRACAUDAL; PERINEURAL AS NEEDED
Status: DISCONTINUED | OUTPATIENT
Start: 2024-02-07 | End: 2024-02-07 | Stop reason: SURG

## 2024-02-07 RX ORDER — KETOROLAC TROMETHAMINE 30 MG/ML
30 INJECTION, SOLUTION INTRAMUSCULAR; INTRAVENOUS ONCE
Status: COMPLETED | OUTPATIENT
Start: 2024-02-07 | End: 2024-02-07

## 2024-02-07 RX ORDER — LIDOCAINE HYDROCHLORIDE AND EPINEPHRINE 15; 5 MG/ML; UG/ML
INJECTION, SOLUTION EPIDURAL AS NEEDED
Status: DISCONTINUED | OUTPATIENT
Start: 2024-02-07 | End: 2024-02-07 | Stop reason: SURG

## 2024-02-07 RX ORDER — AMMONIA INHALANTS 0.04 G/.3ML
0.3 INHALANT RESPIRATORY (INHALATION) AS NEEDED
Status: DISCONTINUED | OUTPATIENT
Start: 2024-02-07 | End: 2024-02-10

## 2024-02-07 RX ORDER — CEFAZOLIN SODIUM/WATER 2 G/20 ML
2 SYRINGE (ML) INTRAVENOUS ONCE
Status: COMPLETED | OUTPATIENT
Start: 2024-02-07 | End: 2024-02-07

## 2024-02-07 RX ORDER — SODIUM CHLORIDE, SODIUM LACTATE, POTASSIUM CHLORIDE, CALCIUM CHLORIDE 600; 310; 30; 20 MG/100ML; MG/100ML; MG/100ML; MG/100ML
INJECTION, SOLUTION INTRAVENOUS CONTINUOUS
Status: DISCONTINUED | OUTPATIENT
Start: 2024-02-07 | End: 2024-02-07

## 2024-02-07 RX ORDER — ONDANSETRON 2 MG/ML
4 INJECTION INTRAMUSCULAR; INTRAVENOUS ONCE AS NEEDED
Status: ACTIVE | OUTPATIENT
Start: 2024-02-07 | End: 2024-02-07

## 2024-02-07 RX ORDER — BUPIVACAINE HYDROCHLORIDE 2.5 MG/ML
20 INJECTION, SOLUTION EPIDURAL; INFILTRATION; INTRACAUDAL ONCE
Status: DISCONTINUED | OUTPATIENT
Start: 2024-02-07 | End: 2024-02-07 | Stop reason: HOSPADM

## 2024-02-07 RX ORDER — METOCLOPRAMIDE HYDROCHLORIDE 5 MG/ML
INJECTION INTRAMUSCULAR; INTRAVENOUS
Status: DISPENSED
Start: 2024-02-07 | End: 2024-02-08

## 2024-02-07 RX ORDER — BUPIVACAINE HCL/0.9 % NACL/PF 0.25 %
5 PLASTIC BAG, INJECTION (ML) EPIDURAL AS NEEDED
Status: DISCONTINUED | OUTPATIENT
Start: 2024-02-07 | End: 2024-02-07

## 2024-02-07 RX ORDER — IBUPROFEN 600 MG/1
600 TABLET ORAL EVERY 6 HOURS
Status: DISCONTINUED | OUTPATIENT
Start: 2024-02-08 | End: 2024-02-07

## 2024-02-07 RX ORDER — IBUPROFEN 600 MG/1
600 TABLET ORAL EVERY 6 HOURS PRN
Status: DISCONTINUED | OUTPATIENT
Start: 2024-02-07 | End: 2024-02-10

## 2024-02-07 RX ORDER — GABAPENTIN 300 MG/1
300 CAPSULE ORAL EVERY 8 HOURS PRN
Status: DISCONTINUED | OUTPATIENT
Start: 2024-02-07 | End: 2024-02-10

## 2024-02-07 RX ORDER — CEFAZOLIN SODIUM/WATER 2 G/20 ML
SYRINGE (ML) INTRAVENOUS
Status: DISPENSED
Start: 2024-02-07 | End: 2024-02-08

## 2024-02-07 RX ORDER — HYDROCODONE BITARTRATE AND ACETAMINOPHEN 7.5; 325 MG/1; MG/1
2 TABLET ORAL EVERY 6 HOURS PRN
Status: DISPENSED | OUTPATIENT
Start: 2024-02-07 | End: 2024-02-08

## 2024-02-07 RX ORDER — HYDROCODONE BITARTRATE AND ACETAMINOPHEN 7.5; 325 MG/1; MG/1
1 TABLET ORAL EVERY 6 HOURS PRN
Status: DISPENSED | OUTPATIENT
Start: 2024-02-07 | End: 2024-02-08

## 2024-02-07 RX ORDER — ONDANSETRON 2 MG/ML
4 INJECTION INTRAMUSCULAR; INTRAVENOUS ONCE AS NEEDED
Status: DISCONTINUED | OUTPATIENT
Start: 2024-02-07 | End: 2024-02-07

## 2024-02-07 RX ORDER — ACETAMINOPHEN 500 MG
1000 TABLET ORAL EVERY 6 HOURS
Status: DISCONTINUED | OUTPATIENT
Start: 2024-02-07 | End: 2024-02-10

## 2024-02-07 RX ORDER — METOCLOPRAMIDE HYDROCHLORIDE 5 MG/ML
10 INJECTION INTRAMUSCULAR; INTRAVENOUS ONCE
Status: COMPLETED | OUTPATIENT
Start: 2024-02-07 | End: 2024-02-07

## 2024-02-07 RX ORDER — ONDANSETRON 2 MG/ML
4 INJECTION INTRAMUSCULAR; INTRAVENOUS EVERY 6 HOURS PRN
Status: DISCONTINUED | OUTPATIENT
Start: 2024-02-07 | End: 2024-02-10

## 2024-02-07 RX ORDER — BISACODYL 10 MG
10 SUPPOSITORY, RECTAL RECTAL ONCE AS NEEDED
Status: DISCONTINUED | OUTPATIENT
Start: 2024-02-07 | End: 2024-02-10

## 2024-02-07 RX ORDER — LIDOCAINE HCL/EPINEPHRINE/PF 2%-1:200K
VIAL (ML) INJECTION AS NEEDED
Status: DISCONTINUED | OUTPATIENT
Start: 2024-02-07 | End: 2024-02-07 | Stop reason: SURG

## 2024-02-07 RX ORDER — SIMETHICONE 80 MG
80 TABLET,CHEWABLE ORAL 3 TIMES DAILY PRN
Status: DISCONTINUED | OUTPATIENT
Start: 2024-02-07 | End: 2024-02-10

## 2024-02-07 RX ORDER — HYDROMORPHONE HYDROCHLORIDE 1 MG/ML
0.6 INJECTION, SOLUTION INTRAMUSCULAR; INTRAVENOUS; SUBCUTANEOUS
Status: ACTIVE | OUTPATIENT
Start: 2024-02-07 | End: 2024-02-08

## 2024-02-07 RX ORDER — FAMOTIDINE 10 MG/ML
INJECTION, SOLUTION INTRAVENOUS
Status: DISPENSED
Start: 2024-02-07 | End: 2024-02-08

## 2024-02-07 RX ORDER — DIPHENHYDRAMINE HCL 25 MG
25 CAPSULE ORAL EVERY 4 HOURS PRN
Status: ACTIVE | OUTPATIENT
Start: 2024-02-07 | End: 2024-02-08

## 2024-02-07 RX ORDER — HYDROMORPHONE HYDROCHLORIDE 1 MG/ML
0.4 INJECTION, SOLUTION INTRAMUSCULAR; INTRAVENOUS; SUBCUTANEOUS
Status: ACTIVE | OUTPATIENT
Start: 2024-02-07 | End: 2024-02-08

## 2024-02-07 RX ORDER — DOCUSATE SODIUM 100 MG/1
100 CAPSULE, LIQUID FILLED ORAL
Status: DISCONTINUED | OUTPATIENT
Start: 2024-02-07 | End: 2024-02-10

## 2024-02-07 RX ORDER — METOCLOPRAMIDE 10 MG/1
10 TABLET ORAL ONCE
Status: COMPLETED | OUTPATIENT
Start: 2024-02-07 | End: 2024-02-07

## 2024-02-07 RX ORDER — FAMOTIDINE 20 MG/1
20 TABLET, FILM COATED ORAL ONCE
Status: COMPLETED | OUTPATIENT
Start: 2024-02-07 | End: 2024-02-07

## 2024-02-07 RX ORDER — NALOXONE HYDROCHLORIDE 0.4 MG/ML
0.08 INJECTION, SOLUTION INTRAMUSCULAR; INTRAVENOUS; SUBCUTANEOUS
Status: ACTIVE | OUTPATIENT
Start: 2024-02-07 | End: 2024-02-08

## 2024-02-07 RX ORDER — HALOPERIDOL 5 MG/ML
0.5 INJECTION INTRAMUSCULAR ONCE AS NEEDED
Status: ACTIVE | OUTPATIENT
Start: 2024-02-07 | End: 2024-02-07

## 2024-02-07 RX ORDER — ACETAMINOPHEN 325 MG/1
650 TABLET ORAL EVERY 6 HOURS PRN
Status: ACTIVE | OUTPATIENT
Start: 2024-02-07 | End: 2024-02-08

## 2024-02-07 RX ORDER — FAMOTIDINE 10 MG/ML
20 INJECTION, SOLUTION INTRAVENOUS ONCE
Status: COMPLETED | OUTPATIENT
Start: 2024-02-07 | End: 2024-02-07

## 2024-02-07 RX ADMIN — SODIUM CHLORIDE, SODIUM LACTATE, POTASSIUM CHLORIDE, CALCIUM CHLORIDE: 600; 310; 30; 20 INJECTION, SOLUTION INTRAVENOUS at 13:36:00

## 2024-02-07 RX ADMIN — LIDOCAINE HYDROCHLORIDE 5 ML: 10 INJECTION, SOLUTION EPIDURAL; INFILTRATION; INTRACAUDAL; PERINEURAL at 07:54:00

## 2024-02-07 RX ADMIN — CEFAZOLIN SODIUM/WATER 2 G: 2 G/20 ML SYRINGE (ML) INTRAVENOUS at 13:41:00

## 2024-02-07 RX ADMIN — SODIUM CHLORIDE, SODIUM LACTATE, POTASSIUM CHLORIDE, CALCIUM CHLORIDE: 600; 310; 30; 20 INJECTION, SOLUTION INTRAVENOUS at 14:35:00

## 2024-02-07 RX ADMIN — LIDOCAINE HYDROCHLORIDE AND EPINEPHRINE 3 ML: 15; 5 INJECTION, SOLUTION EPIDURAL at 08:00:00

## 2024-02-07 RX ADMIN — LIDOCAINE HCL/EPINEPHRINE/PF 15 ML: 2%-1:200K VIAL (ML) INJECTION at 13:36:00

## 2024-02-07 NOTE — ANESTHESIA PROCEDURE NOTES
Labor Analgesia    Date/Time: 2/7/2024 7:52 AM    Performed by: Nell Simpson MD  Authorized by: Nell Simpson MD      General Information and Staff    Start Time:  2/7/2024 7:52 AM  End Time:  2/7/2024 8:06 AM  Anesthesiologist:  Nell Simpson MD  Performed by:  Anesthesiologist  Patient Location:  OB  Site Identification: surface landmarks    Reason for Block: labor epidural    Preanesthetic Checklist: patient identified, IV checked, site marked, risks and benefits discussed, monitors and equipment checked, pre-op evaluation, timeout performed, anesthesia consent and sterile technique used      Procedure Details    Patient Position:  Sitting  Prep: ChloraPrep    Monitoring:  Heart rate  Approach:  Midline    Epidural Needle    Injection Technique:  BAUDILIO air  Needle Type:  Tuohy  Needle Gauge:  18 G  Needle Length:  3.5 in  Needle Insertion Depth:  5  Location:  L2-3    Spinal Needle      Catheter    Catheter Type:  Multi-orifice  Catheter Size:  20 G  Catheter at Skin Depth:  11  Test Dose:  Negative    Assessment    Sensory Level:  T10    Additional Comments

## 2024-02-07 NOTE — ANESTHESIA PREPROCEDURE EVALUATION
Anesthesia PreOp Note    HPI:     Jeannie Horton is a 33 year old female who presents for preoperative consultation requested by: * No surgeons listed *    Date of Surgery: 2024    * No procedures listed *  Indication: * No pre-op diagnosis entered *    Relevant Problems   No relevant active problems       NPO:                         History Review:  Patient Active Problem List    Diagnosis Date Noted    Pregnancy 2024    Post-dates pregnancy 2024       Past Medical History:   Diagnosis Date    Anemia        Past Surgical History:   Procedure Laterality Date    WISDOM TEETH REMOVED         Medications Prior to Admission   Medication Sig Dispense Refill Last Dose    [] mupirocin 2 % External Ointment Apply topically. (Patient not taking: Reported on 2023)       Prenatal 27-0.8 MG Oral Tab Take 1 tablet by mouth daily.       Ferrous Sulfate 325 (65 Fe) MG Oral Tab Take 1 tablet (325 mg total) by mouth every other day. 90 tablet 3      Current Facility-Administered Medications Ordered in Epic   Medication Dose Route Frequency Provider Last Rate Last Admin    fentaNYL-bupivacaine 2 mcg/mL-0.125% in sodium chloride 0.9% 100 mL EPIDURAL infusion premix   Epidural Continuous Chago Haas MD        fentaNYL (Sublimaze) 50 mcg/mL injection 100 mcg  100 mcg Epidural Once Chago Haas MD        bupivacaine PF (Marcaine) 0.25% injection  20 mL Epidural Once Chago Haas MD        EPHEDrine (PF) 25 MG/5 ML injection 5 mg  5 mg Intravenous PRN Chago Haas MD        nalbuphine (Nubain) 10 mg/mL injection 2.5 mg  2.5 mg Intravenous Q15 Min PRN Chago Haas MD        acetaminophen (Tylenol Extra Strength) tab 500 mg  500 mg Oral Q6H PRN Seamus Chi MD        acetaminophen (Tylenol Extra Strength) tab 1,000 mg  1,000 mg Oral Q6H PRN Seamus Chi MD        ibuprofen (Motrin) tab 600 mg  600 mg Oral Once PRN Seamus Chi MD        ondansetron (Zofran) 4 MG/2ML  injection 4 mg  4 mg Intravenous Q6H PRN Seamus Chi MD   4 mg at 02/07/24 0549    oxyTOCIN in sodium chloride 0.9% (Pitocin) 30 Units/500mL infusion premix  62.5-900 grupo-units/min Intravenous Continuous Seamus Chi MD        terbutaline (Brethine) 1 MG/ML injection 0.25 mg  0.25 mg Subcutaneous PRN Seamus Chi MD        sodium citrate-citric acid (Bicitra) 500-334 MG/5ML oral solution 30 mL  30 mL Oral PRN Seamus Chi MD        lidocaine PF (Xylocaine-MPF) 1% injection  30 mL Intradermal Once Seamus Chi MD        lactated ringers infusion   Intravenous Continuous Seamus Chi MD   Paused at 02/07/24 0549    dextrose in lactated ringers 5% infusion   Intravenous PRN Seamus Chi MD   Stopped at 02/07/24 0626    lactated ringers IV bolus 500 mL  500 mL Intravenous PRN Seamus Chi MD   Stopped at 02/07/24 0705    fentaNYL (Sublimaze) 50 mcg/mL injection 50 mcg  50 mcg Intravenous Q30 Min PRN Seamus Chi MD   50 mcg at 02/07/24 0653    penicillin G potassium 3 Million Units in sodium chloride 0.9% 100 mL IVPB  3 Million Units Intravenous Q4H Seamus Chi  mL/hr at 02/07/24 0532 3 Million Units at 02/07/24 0532    misoprostol (CYTOTEC) partial tablets 25 mcg  25 mcg Vaginal Q4H Seamus Chi MD   25 mcg at 02/07/24 0305     No current Epic-ordered outpatient medications on file.       No Known Allergies    No family history on file.  Social History     Socioeconomic History    Marital status: Single   Tobacco Use    Smoking status: Never    Smokeless tobacco: Never   Substance and Sexual Activity    Alcohol use: Not Currently     Alcohol/week: 1.0 standard drink of alcohol     Types: 1 Standard drinks or equivalent per week     Comment: socially    Drug use: Never       Available pre-op labs reviewed.  Lab Results   Component Value Date    WBC 6.2 02/06/2024    RBC 4.30 02/06/2024    HGB 11.9 (L) 02/06/2024    HCT 36.5  02/06/2024    MCV 84.9 02/06/2024    MCH 27.7 02/06/2024    MCHC 32.6 02/06/2024    RDW 18.2 (H) 02/06/2024    .0 02/06/2024             Vital Signs:  Body mass index is 34.01 kg/m².   height is 1.549 m (5' 1\") and weight is 81.6 kg (180 lb). Her oral temperature is 98.8 °F (37.1 °C). Her blood pressure is 123/63 and her pulse is 66. Her respiration is 16.   Vitals:    02/07/24 0530 02/07/24 0759 02/07/24 0801 02/07/24 0803   BP: 119/73 129/69 105/58 123/63   Pulse: 67 72 106 66   Resp: 16      Temp: 98.8 °F (37.1 °C)      TempSrc: Oral      Weight:       Height:            Anesthesia Evaluation      Airway   Mallampati: I  TM distance: >3 FB  Neck ROM: full  Dental      Pulmonary - normal exam   Cardiovascular - normal exam    Neuro/Psych      GI/Hepatic/Renal      Endo/Other    Abdominal   (+) obese                 Anesthesia Plan:   ASA:  2  Emergent    Plan:   Epidural  Informed Consent Plan and Risks Discussed With:  Patient      I have informed Jeannie Sanfordrera and/or legal guardian or family member of the nature of the anesthetic plan, benefits, risks including possible dental damage if relevant, major complications, and any alternative forms of anesthetic management.   All of the patient's questions were answered to the best of my ability. The patient desires the anesthetic management as planned.  JAYLENE TAY MD  2/7/2024 8:04 AM  Present on Admission:  **None**

## 2024-02-07 NOTE — PROGRESS NOTES
Patient transferred to mother/baby room 368 per cart in stable condition with baby and personal belongings.  Accompanied by  and staff.  Report given to mother/baby RN Dawson.

## 2024-02-07 NOTE — ANESTHESIA POSTPROCEDURE EVALUATION
Patient: Jeannie Horton    Procedure Summary       Date: 02/07/24 Room / Location:     Anesthesia Start: 0752 Anesthesia Stop:     Procedure: LABOR ANALGESIA Diagnosis:     Scheduled Providers:  Anesthesiologist: Nell Simpson MD    Anesthesia Type: epidural ASA Status: 2 - Emergent            Anesthesia Type: epidural    Vitals Value Taken Time   /95 02/07/24 1435   Temp 99.3 °F (37.4 °C) 02/07/24 1435   Pulse 95 02/07/24 1435   Resp 16 02/07/24 1435   SpO2 99 % 02/07/24 1435       [facility] AN Post Evaluation:   Patient Evaluated in PACU  Patient Participation: complete - patient participated  Level of Consciousness: awake  Pain Management: adequate  Airway Patency:patent  Dental exam unchanged from preop  Yes    Cardiovascular Status: acceptable  Respiratory Status: acceptable  Postoperative Hydration acceptable      NELL SIMPSON MD  2/7/2024 2:38 PM

## 2024-02-07 NOTE — H&P
[facility]    History & Physical    Jeannie Horton Patient Status:  Inpatient    1990 MRN [id number]   Location [facility] Attending Seamus Chi MD   Hosp Day # 0 PCP No primary care provider on file.     Date of Admission:  2024      HPI:   Jeannie Horton is a 33 year old  female, current EGA of 40w2d with an estimated date of delivery of: Estimated Date of Delivery: 24      Jeannie Horton is being admitted for induction of labor.    Her current obstetrical history is significant for  postdates .    Patient reports good fetal movement .     Fetal Movement: normal.     History   Obstetric History:   OB History    Para Term  AB Living   1             SAB IAB Ectopic Multiple Live Births                  # Outcome Date GA Lbr Dom/2nd Weight Sex Delivery Anes PTL Lv   1 Current              Past Medical History:   Past Medical History:   Diagnosis Date    Anemia      Past Social History:   Past Surgical History:   Procedure Laterality Date    WISDOM TEETH REMOVED       Family History: No family history on file.  Social History:   Social History     Tobacco Use    Smoking status: Never    Smokeless tobacco: Never   Substance Use Topics    Alcohol use: Not Currently     Alcohol/week: 1.0 standard drink of alcohol     Types: 1 Standard drinks or equivalent per week     Comment: socially        Allergies/Medications:   Allergies:   No Known Allergies  Medications:  Medications Prior to Admission   Medication Sig Dispense Refill Last Dose    [] mupirocin 2 % External Ointment Apply topically. (Patient not taking: Reported on 2023)       Prenatal 27-0.8 MG Oral Tab Take 1 tablet by mouth daily.       Ferrous Sulfate 325 (65 Fe) MG Oral Tab Take 1 tablet (325 mg total) by mouth every other day. 90 tablet 3        Review of Systems:   As documented in HPI    good fetal movement    Physical Exam:   Temp:  [99.4 °F (37.4 °C)] 99.4  °F (37.4 °C)  Pulse:  [81] 81  Resp:  [16] 16  BP: (116)/(63) 116/63    Constitutional: alert, appears stated age, and cooperative  Respiratory: clear to auscultation bilaterally  Cardiac: regular rate and rhythm, S1, S2 normal, no murmur, click, rub or gallop  Abdomen: FHT present  Fetal Surveillance:  reactive nst,  cat 1    Cervix:  FT rn exam.  Cytotec ordered for IOL.     Results:     Lab Results   Component Value Date    TREPONEMALAB Nonreactive 2023    ABO O 10/11/2023    RH Positive 10/11/2023    WBC 6.2 2024    HGB 11.9 (L) 2024    HCT 36.5 2024    .0 2024       Lab Results   Component Value Date    SPECGRAVITY 1.010 2024    NITRITE Negative 2024       No results found.      Assessment/Plan:    Jeannie Horton is at an estimated gestational age of 40w2d with an estimated date of delivery of:  Estimated Date of Delivery: 24    Not in labor.  Obstetrical history significant for  postdates .    Admission problem(s):    Pregnancy  32 yo  at 40 2/7 weeks scheduled for IOL for postdates.  Gbs pos,  pcn ordered.            Risks, benefits, alternatives and possible complications have been discussed in detail with the patient.   Pre-admission, admission, and post admission procedures and expectations were discussed in detail.    All questions answered, all appropriate consents will be signed at the Hospital. Admission is planned for today.   Continue present management..    Seamus Chi MD  2024  9:31 PM

## 2024-02-07 NOTE — PLAN OF CARE

## 2024-02-07 NOTE — L&D DELIVERY NOTE
[facility]     Section Delivery Note    Jeannie Horton Patient Status:  Inpatient    1990 MRN [id number]   Location [facility] Attending Seamus Chi MD   Hosp Day # 1 PCP No primary care provider on file.     Pre Op Diagnosis: arrest of dilitation and non-reassuring fetal well being  Post Op Diagnosis: same as pre-op  Procedure:   Surgical/Procedural Cases on this Admission       Case IDs Date Procedure Surgeon Location Status    [id number]  SECTION Seamus Chi MD [facility] L+D OR [facility]          LT    Surgeon:  Seamus Chi MD  Assistant Surgeon:  john   Anesthesia: epidural  Complications: none  Neonatologist Present: yes  Findings: normal uterus    Delivery     Infant  Date of Delivery: 2024    Time of Delivery: 1:53 PM   Delivery Type: Caesarean Section     Infant Sex/Birthweight: male 9 lb 7.3 oz (4.29 kg)     Presentation Vertex [1]   Position          Apgars:  1 minute: 9                5 minutes: 9                         10 minutes:      Placenta  Date/Time of Delivery: 2024  1:55 PM    Delivery: spontaneous  Placenta to Pathology: yes  Cord Gases Submitted: yes  Cord Blood Collection: yes  Cord Tissue Collection:no   Cord Complications: none  Sponge and Needle Counts:  Verified: yes  Delivery Comment:  infant pink crying and active.    Dictation Number:  pending.    Input/Output   EBL:  700 ml  Delivery Fluids:  LR pending.   Urine Output: pending. ml  Urine Color: clear    Seamus Chi MD  2024  4:03 PM

## 2024-02-07 NOTE — PROGRESS NOTES
Pt is a 33 year old female admitted to 375/375-A.     Chief Complaint   Patient presents with    Scheduled Induction     Post dates      Pt is  40w2d intra-uterine pregnancy.  History obtained, consents signed. Oriented to room, staff, and plan of care.

## 2024-02-08 LAB
BASOPHILS # BLD AUTO: 0.03 X10(3) UL (ref 0–0.2)
BASOPHILS NFR BLD AUTO: 0.4 %
DEPRECATED RDW RBC AUTO: 59.2 FL (ref 35.1–46.3)
EOSINOPHIL # BLD AUTO: 0.09 X10(3) UL (ref 0–0.7)
EOSINOPHIL NFR BLD AUTO: 1.1 %
ERYTHROCYTE [DISTWIDTH] IN BLOOD BY AUTOMATED COUNT: 18.8 % (ref 11–15)
HCT VFR BLD AUTO: 28.4 %
HGB BLD-MCNC: 9.6 G/DL
IMM GRANULOCYTES # BLD AUTO: 0.03 X10(3) UL (ref 0–1)
IMM GRANULOCYTES NFR BLD: 0.4 %
LYMPHOCYTES # BLD AUTO: 1.41 X10(3) UL (ref 1–4)
LYMPHOCYTES NFR BLD AUTO: 17.4 %
MCH RBC QN AUTO: 29.5 PG (ref 26–34)
MCHC RBC AUTO-ENTMCNC: 33.8 G/DL (ref 31–37)
MCV RBC AUTO: 87.4 FL
MONOCYTES # BLD AUTO: 0.5 X10(3) UL (ref 0.1–1)
MONOCYTES NFR BLD AUTO: 6.2 %
NEUTROPHILS # BLD AUTO: 6.06 X10 (3) UL (ref 1.5–7.7)
NEUTROPHILS # BLD AUTO: 6.06 X10(3) UL (ref 1.5–7.7)
NEUTROPHILS NFR BLD AUTO: 74.5 %
PLATELET # BLD AUTO: 184 10(3)UL (ref 150–450)
RBC # BLD AUTO: 3.25 X10(6)UL
WBC # BLD AUTO: 8.1 X10(3) UL (ref 4–11)

## 2024-02-08 RX ORDER — MELATONIN
325
Status: CANCELLED | OUTPATIENT
Start: 2024-02-08

## 2024-02-08 NOTE — PLAN OF CARE
Problem: POSTPARTUM  Goal: Long Term Goal:Experiences normal postpartum course  Description: INTERVENTIONS:  - Assess and monitor vital signs and lab values.  - Assess fundus and lochia.  - Provide ice/sitz baths for perineum discomfort.  - Monitor healing of incision/episiotomy/laceration, and assess for signs and symptoms of infection and hematoma.  - Assess bladder function and monitor for bladder distention.  - Provide/instruct/assist with pericare as needed.  - Provide VTE prophylaxis as needed.  - Monitor bowel function.  - Encourage ambulation and provide assistance as needed.  - Assess and monitor emotional status and provide social service/psych resources as needed.  - Utilize standard precautions and use personal protective equipment as indicated. Ensure aseptic care of all intravenous lines and invasive tubes/drains.  - Obtain immunization and exposure to communicable diseases history.  Outcome: Progressing  Goal: Optimize infant feeding at the breast  Description: INTERVENTIONS:  - Initiate breast feeding within first hour after birth.   - Monitor effectiveness of current breast feeding efforts.  - Assess support systems available to mother/family.  - Identify cultural beliefs/practices regarding lactation, letdown techniques, maternal food preferences.  - Assess mother's knowledge and previous experience with breast feeding.  - Provide information as needed about early infant feeding cues (e.g., rooting, lip smacking, sucking fingers/hand) versus late cue of crying.  - Discuss/demonstrate breast feeding aids (e.g., infant sling, nursing footstool/pillows, and breast pumps).  - Encourage mother/other family members to express feelings/concerns, and actively listen.  - Educate father/SO about benefits of breast feeding and how to manage common lactation challenges.  - Recommend avoidance of specific medications or substances incompatible with breast feeding.  - Assess and monitor for signs of nipple  pain/trauma.  - Instruct and provide assistance with proper latch.  - Review techniques for milk expression (breast pumping) and storage of breast milk. Provide pumping equipment/supplies, instructions and assistance, as needed.  - Encourage rooming-in and breast feeding on demand.  - Encourage skin-to-skin contact.  - Provide LC support as needed.  - Assess for and manage engorgement.  - Provide breast feeding education handouts and information on community breast feeding support.   Outcome: Progressing  Goal: Establishment of adequate milk supply with medication/procedure interruptions  Description: INTERVENTIONS:  - Review techniques for milk expression (breast pumping).   - Provide pumping equipment/supplies, instructions, and assistance until it is safe to breastfeed infant.  Outcome: Progressing  Goal: Experiences normal breast weaning course  Description: INTERVENTIONS:  - Assess for and manage engorgement.  - Instruct on breast care.  - Provide comfort measures.  Outcome: Progressing  Goal: Appropriate maternal -  bonding  Description: INTERVENTIONS:  - Assess caregiver- interactions.  - Assess caregiver's emotional status and coping mechanisms.  - Encourage caregiver to participate in  daily care.  - Assess support systems available to mother/family.  - Provide /case management support as needed.  Outcome: Progressing     Problem: GASTROINTESTINAL - ADULT  Goal: Minimal or absence of nausea and vomiting  Description: INTERVENTIONS:  - Maintain adequate hydration with IV or PO as ordered and tolerated  - Nasogastric tube to low intermittent suction as ordered  - Evaluate effectiveness of ordered antiemetic medications  - Provide nonpharmacologic comfort measures as appropriate  - Advance diet as tolerated, if ordered  - Obtain nutritional consult as needed  - Evaluate fluid balance  Outcome: Progressing  Goal: Maintains or returns to baseline bowel function  Description:  INTERVENTIONS:  - Assess bowel function  - Maintain adequate hydration with IV or PO as ordered and tolerated  - Evaluate effectiveness of GI medications  - Encourage mobilization and activity  - Obtain nutritional consult as needed  - Establish a toileting routine/schedule  - Consider collaborating with pharmacy to review patient's medication profile  Outcome: Progressing  Goal: Maintains adequate nutritional intake (undernourished)  Description: INTERVENTIONS:  - Monitor percentage of each meal consumed  - Identify factors contributing to decreased intake, treat as appropriate  - Assist with meals as needed  - Monitor I&O, WT and lab values  - Obtain nutritional consult as needed  - Optimize oral hygiene and moisture  - Encourage food from home; allow for food preferences  - Enhance eating environment  Outcome: Progressing  Goal: Achieves appropriate nutritional intake (bariatric)  Description: INTERVENTIONS:  - Monitor for over-consumption  - Identify factors contributing to increased intake, treat as appropriate  - Monitor I&O, WT and lab values  - Obtain nutritional consult as needed  - Evaluate psychosocial factors contributing to over-consumption  Outcome: Progressing

## 2024-02-08 NOTE — OPERATIVE REPORT
[facility]    PATIENT'S NAME: DEB SALEH   ATTENDING PHYSICIAN: Seamus Chi MD   OPERATING PHYSICIAN: Seamus Chi MD   PATIENT ACCOUNT#:   [id number]    LOCATION:  [address] [place] [address]  MEDICAL RECORD #:   [id number]       YOB: 1990  ADMISSION DATE:       2024      OPERATION DATE:  2024    OPERATIVE REPORT      PREOPERATIVE DIAGNOSIS:  Arrest of dilation and nonreassuring fetal well-being.  POSTOPERATIVE DIAGNOSIS:  Arrest of dilation and nonreassuring fetal well-being.  PROCEDURE:  Primary low transverse  section.    ASSISTANT SURGEON:  Trinity Mathis MD.    ANESTHESIA:  Epidural, Dr. Simpson.    ESTIMATED BLOOD LOSS:  700 mL.     URINE OUTPUT:  Clear yellow urine.  Amount pending.    COMPLICATIONS:  None.    CONDITION:  Patient tolerated the procedure well and was taken to the PAR in good condition.    INDICATIONS:  Patient is a 33-year-old female, induction of labor for postdates, who was subsequently noted to have no cervical change.  In addition, the patient was noted to have decreased/minimal variability and decelerations.  The patient was counseled on options of management including continued induction of labor versus a primary low transverse  section, and the patient considered her options with her .  The patient at this time has declined any further labor and requested to proceed with primary low transverse  section.  The risks, benefits, and alternatives counseled including the risk of bleeding; infection; damage to internal organs (including bowel, bladder, uterus, ovaries, tubes, cervix, vagina, ureters, blood vessels, among other organs); risk of anesthesia; risk of thromboembolic disease such as DVT, PE, MI, stroke; risk of hemorrhage and blood transfusion; among other risks.  The patient voiced understanding of all the above, and all questions were answered.    FINDINGS:  Delivered a viable male infant,  weighing 4290 g.  Apgars 9 at one minute and 9 at five minutes.    OPERATIVE TECHNIQUE:  The patient was taken to the operating room and after epidural anesthesia was bolused, she was prepped and draped in usual sterile fashion.  The patient received antibiotic prophylaxis prior to her procedure.  Sequential compression devices were started prior to procedure, were operative throughout the procedure, and will be operative postop as well for DVT prophylaxis.  Testing revealed excellent epidural anesthesia.  A knife was used to perform a Pfannenstiel skin incision which was carried down through the subcutaneous tissue to the fascia.  The fascia was incised in the middle.  Incision extended laterally.  Fascia was raised off the rectus muscles.  Rectus muscles were  in the midline.  Peritoneum was entered.  Peritoneal incision was extended.  The bladder flap was made, and the bladder blade was replaced.  A knife was used to perform a low transverse uterine incision which was extended laterally using the digital method.  Clear fluid noted on entry into the intrauterine cavity.  The infant was delivered in cephalic presentation without difficulty and bulb suctioned on the maternal abdomen.  Then, 30 seconds of delayed cord clamping was performed per Neonatology, after which the cord was clamped and cut.  The infant was taken to warmer in good condition.  Cord pH and cord bloods were obtained.  The placenta was delivered intact and sent to Pathology.  Uterus, ovaries, and tubes were exteriorized and after it was assured that all clots and membranes were removed from the uterine cavity, uterine incision was closed with 0 Vicryl suture in a running interlocking stitch, followed by 0 Vicryl suture in a running imbricating stitch.  In addition, figure-of-eight suture was placed in the midline of the incision.  Excellent hemostasis noted at this time.  Copious irrigation was performed in all quadrants.  Uterus,  ovaries, and tubes were grossly normal and were replaced intraperitoneally once again and irrigation was performed.  Excellent hemostasis noted once again.  All counts were correct.  The rectus muscles were hemostatic.  The fascia was closed with #1 Vicryl x2 and excellent closure noted.  Subcutaneous tissue was irrigated.  Excellent hemostasis noted.  All counts were correct.  The skin was closed with 3-0 Vicryl Rapide suture in a subcuticular stitch, followed by benzoin and Steri-Strips.  A pressure dressing was applied.  All counts were correct.  The patient tolerated the procedure well and was taken to PAR in good condition.     Dictated By Seamus Chi MD  d: 02/07/2024 16:11:53  t: 02/08/2024 01:08:32  Job [id number]  AAS/

## 2024-02-08 NOTE — LACTATION NOTE
This note was copied from a baby's chart.  LACTATION NOTE - INFANT    Evaluation Type  Evaluation Type: Inpatient    Problems & Assessment  Problems Diagnosed or Identified: Shallow latch  Infant Assessment: Hunger cues present  Muscle tone: Appropriate for GA    Feeding Assessment  Summary Current Feeding: Breastfeeding exclusively  Breastfeeding Assessment: Assisted with breastfeeding w/mother's permission;Sustained nutrititive latch w/audible swallows;Coordinated suck/swallow;Tolerated feeding well;Deep latch achieved and observed  Breastfeeding lasted # of minutes: 15  Breastfeeding Positions: left breast;right breast;football  Latch: Grasps breast, tongue down, lips flanged, rhythmic sucking  Audible Sucks/Swallows: Spontaneous and intermittent (24 hours old)  Type of Nipple: Everted (after stimulation)  Comfort (Breast/Nipple): Soft/non-tender  Hold (Positioning): Full assist, teach one side, mother does other, staff holds  LATCH Score: 9

## 2024-02-08 NOTE — PROGRESS NOTES
POD 1    Pt doing well.  No c/o.     Alert and oriented nad  Abd soft nontender fundus firm, dressing c/d/I  Ext nt    Hb 9.6    A/P POD 1  pt doing well.  No c/o.   Abd binder.

## 2024-02-08 NOTE — LACTATION NOTE
LACTATION NOTE - MOTHER      Evaluation Type: Inpatient    Problems identified  Problems identified: Knowledge deficit    Maternal history  Maternal history: Caesarean section    Breastfeeding goal  Breastfeeding goal: To maintain breast milk feeding per patient goal    Maternal Assessment  Bilateral Breasts: Wide spaced;Soft  Bilateral Nipples: Slightly everted/short;Colostrum easily expressed  Prior breastfeeding experience (comment below): Primip  Prior BF experience: comment: breastfeeding class  Breastfeeding Assistance: Breastfeeding assistance provided with permission    Pain assessment  Location/Comment: denies  Treatment of Sore Nipples: Deeper latch techniques    Guidelines for use of:  Suggested use of pump: Pump each time a supplement is offered  Other (comment): Assisted with positioning in football hold, demonstrated technique for achieving deeper latch. Mom able to return demonstration. Answered questions r/t pumping, bottle feeding and formula supplement, which was given for 1 feeding. Pumping deferred at this time as mom plans on exclusively breastfeeding. Discussed cluster-feeding patterns, early hunger cues and signs of adequate intake.

## 2024-02-08 NOTE — CM/SW NOTE
SW self referral due to finances/WIC resources    SW met with patient and FOB  bedside.  SW confirmed face sheet contact as correct.    Baby boy/girl name:Susana Murphy  Date & time of delivery:2024 @ 1:53pm  Delivery method: section  Siblings age:n/a    Patient employed: Denied  Length of maternity leave:n/a    Father of baby employed:Yes  Length of paternity leave:Denied    Breast or formula feed:Breast feed    Pediatrician:TBD  SW encouraged pt to schedule infant first appointment (usually within 48 hours of discharge) prior to pt discharge. Pt expressed understanding.     Infant Insurance:Medicaid  [employer] [facility] contacted:Yes    Mental Health History: Denied    Medications:n/a    Therapist:n/a    Psychiatrist:n/a    CARLTON discussed signs, symptoms and risks associated with post partum depression & anxiety.  SW provided pt with PMAD resources.  Other resources provided:[employer] transportation and mental health resources.  [facility] specific resources.  Pt endorses she is current w/WIC services and was encouraged to contact them informing of infants birth.  Pt expressed understanding.     Patient support system:FOB and extended family    Patient denied current questions/needs from SW.    SW/CM to remain available for support and/or discharge planning.      Minna Pandya, MSW, LSW  Social Work   Ext:#[phone]

## 2024-02-08 NOTE — ANESTHESIA POST-OP FOLLOW-UP NOTE
Ms. Jeannie Horton is POD#1 after her  performed under neuraxial anesthesia. Denies headache, back pain, or residual LE weakness/numbness.  Spinal placement site examined, no erythema, hematoma, or tenderness to palpation.   Pain score is 2/10.   No further anesthesia management needed at this time. Doing well on oral pain meds.     All anesthetic questions answered.     Ella Talley MD

## 2024-02-09 NOTE — PROGRESS NOTES
POD 2     Pt doing well.  No c/o.      Alert and oriented nad  Abd soft nontender fundus firm, dressing c/d/I  Ext nt     Hb 9.6     A/P POD 2  pt doing well.  No c/o.   Abd binder.

## 2024-02-09 NOTE — LACTATION NOTE
This note was copied from a baby's chart.     02/09/24 1130   Evaluation Type   Evaluation Type Inpatient   Problems & Assessment   Problems Diagnosed or Identified Shallow latch   Infant Assessment Hunger cues present   Muscle tone Appropriate for GA   Feeding Assessment   Summary Current Feeding Breastfeeding exclusively   Breastfeeding Assessment Assisted with breastfeeding w/mother's permission;Sustained nutrititive latch w/audible swallows;Calm and ready to breastfeed;Coordinated suck/swallow;Tolerated feeding well;Deep latch achieved and observed   Breastfeeding lasted # of minutes 15   Breastfeeding Positions football;right breast;left breast   Latch 1   Audible Sucks/Swallows 2   Type of Nipple 2   Comfort (Breast/Nipple) 2   Hold (Positioning) 1   LATCH Score 8   Other (comment) Mom states that she now has nipple pain, nipples are red, has been using lanolin and not getting better, gelpads given, assisted with a latch in footbal hold to both sides, deep latch was attained with the use of pillows and deep latch techniques, encouraged to call if needed.

## 2024-02-09 NOTE — LACTATION NOTE
02/09/24 1030   Evaluation Type   Evaluation Type Inpatient   Problems identified   Problems identified Knowledge deficit;Nipple pain   Maternal history   Maternal history Caesarean section;Induction of labor;Anemia   Breastfeeding goal   Breastfeeding goal To maintain breast milk feeding per patient goal   Maternal Assessment   Bilateral Breasts Wide spaced;Soft   Bilateral Nipples Slightly everted/short;Colostrum easily expressed;Elastic;Sore   Prior breastfeeding experience (comment below) Primip   Prior BF experience: comment breastfeeding class   Breastfeeding Assistance Breastfeeding assistance provided with permission   Pain assessment   Pain, additional Pain location   Pain Location Nipples   Treatment of Sore Nipples Deeper latch techniques;Expressed breast milk;Hydrogel dressings as directed;Lanolin   Guidelines for use of:   Equipment Hydrogel dressings   Breast pump type Spectra   Current use of pump: not currently used   Suggested use of pump Pump each time a supplement is offered   Other (comment) Mom states that she now has nipple pain, nipples are red, has been using lanolin and not getting better, gelpads given, assisted with a latch in footbal hold to both sides, deep latch was attained with the use of pillows and deep latch techniques, encouraged to call if needed.

## 2024-02-10 VITALS
OXYGEN SATURATION: 98 % | HEIGHT: 61 IN | RESPIRATION RATE: 15 BRPM | BODY MASS INDEX: 33.99 KG/M2 | SYSTOLIC BLOOD PRESSURE: 116 MMHG | TEMPERATURE: 99 F | HEART RATE: 61 BPM | WEIGHT: 180 LBS | DIASTOLIC BLOOD PRESSURE: 54 MMHG

## 2024-02-10 RX ORDER — IBUPROFEN 600 MG/1
600 TABLET ORAL EVERY 6 HOURS PRN
Qty: 40 TABLET | Refills: 0 | Status: SHIPPED | OUTPATIENT
Start: 2024-02-10

## 2024-02-10 RX ORDER — GABAPENTIN 300 MG/1
300 CAPSULE ORAL EVERY 8 HOURS PRN
Qty: 12 CAPSULE | Refills: 0 | Status: SHIPPED | OUTPATIENT
Start: 2024-02-10

## 2024-02-10 RX ORDER — ACETAMINOPHEN 500 MG
500 TABLET ORAL EVERY 6 HOURS PRN
Qty: 40 TABLET | Refills: 1 | Status: SHIPPED | OUTPATIENT
Start: 2024-02-10

## 2024-02-10 RX ORDER — PSEUDOEPHEDRINE HCL 30 MG
100 TABLET ORAL 2 TIMES DAILY PRN
Qty: 30 CAPSULE | Refills: 1 | Status: SHIPPED | OUTPATIENT
Start: 2024-02-10

## 2024-02-10 NOTE — LACTATION NOTE
This note was copied from a baby's chart.     02/10/24 0900   Evaluation Type   Evaluation Type Inpatient   Problems & Assessment   Infant Assessment Hunger cues present   Muscle tone Appropriate for GA   Feeding Assessment   Summary Current Feeding Breastfeeding exclusively   Breastfeeding Assessment Assisted with breastfeeding w/mother's permission;Deep latch achieved and observed;Calm and ready to breastfeed;Tolerated feeding well;Coordinated suck/swallow;Sustained nutrititive latch w/audible swallows   Breastfeeding lasted # of minutes 10  (still nursing)   Breastfeeding Positions football;left breast   Latch 1   Audible Sucks/Swallows 2   Type of Nipple 2   Comfort (Breast/Nipple) 2   Hold (Positioning) 2   LATCH Score 9   Other (comment) Mom breastfeeding when entered the room, infant in deep latch, football hold to left side, mom states that infant has been nursing well, nipple pain about the same with lanolin, gel pads given and discussed, couplet to go home today, encouraged to call if needed.

## 2024-02-10 NOTE — LACTATION NOTE
02/10/24 0900   Evaluation Type   Evaluation Type Inpatient   Problems identified   Problems identified Knowledge deficit;Nipple pain   Maternal history   Maternal history Caesarean section;Induction of labor;Anemia   Breastfeeding goal   Breastfeeding goal To maintain breast milk feeding per patient goal   Maternal Assessment   Bilateral Breasts Wide spaced;Soft   Bilateral Nipples Colostrum easily expressed;Elastic;Slightly everted/short;Sore   Prior breastfeeding experience (comment below) Primip   Prior BF experience: comment breastfeeding class   Breastfeeding Assistance Breastfeeding assistance provided with permission   Pain assessment   Pain, additional Pain location   Pain Location Nipples   Location/Comment both nipples   Treatment of Sore Nipples Hydrogel dressings as directed;Lanolin;Deeper latch techniques;Expressed breast milk   Guidelines for use of:   Equipment Hydrogel dressings;Lanolin   Breast pump type Spectra   Current use of pump: not currently used   Suggested use of pump Pump each time a supplement is offered   Other (comment) Mom breastfeeding when entered the room, infant in deep latch, football hold to left side, mom states that infant has been nursing well, nipple pain about the same with lanolin, gel pads given and discussed, couplet to go home today,  encouraged to call if needed.

## 2024-02-10 NOTE — PLAN OF CARE
Problem: POSTPARTUM  Goal: Long Term Goal:Experiences normal postpartum course  Description: INTERVENTIONS:  - Assess and monitor vital signs and lab values.  - Assess fundus and lochia.  - Provide ice/sitz baths for perineum discomfort.  - Monitor healing of incision/episiotomy/laceration, and assess for signs and symptoms of infection and hematoma.  - Assess bladder function and monitor for bladder distention.  - Provide/instruct/assist with pericare as needed.  - Provide VTE prophylaxis as needed.  - Monitor bowel function.  - Encourage ambulation and provide assistance as needed.  - Assess and monitor emotional status and provide social service/psych resources as needed.  - Utilize standard precautions and use personal protective equipment as indicated. Ensure aseptic care of all intravenous lines and invasive tubes/drains.  - Obtain immunization and exposure to communicable diseases history.  Outcome: Completed  Goal: Optimize infant feeding at the breast  Description: INTERVENTIONS:  - Initiate breast feeding within first hour after birth.   - Monitor effectiveness of current breast feeding efforts.  - Assess support systems available to mother/family.  - Identify cultural beliefs/practices regarding lactation, letdown techniques, maternal food preferences.  - Assess mother's knowledge and previous experience with breast feeding.  - Provide information as needed about early infant feeding cues (e.g., rooting, lip smacking, sucking fingers/hand) versus late cue of crying.  - Discuss/demonstrate breast feeding aids (e.g., infant sling, nursing footstool/pillows, and breast pumps).  - Encourage mother/other family members to express feelings/concerns, and actively listen.  - Educate father/SO about benefits of breast feeding and how to manage common lactation challenges.  - Recommend avoidance of specific medications or substances incompatible with breast feeding.  - Assess and monitor for signs of nipple  pain/trauma.  - Instruct and provide assistance with proper latch.  - Review techniques for milk expression (breast pumping) and storage of breast milk. Provide pumping equipment/supplies, instructions and assistance, as needed.  - Encourage rooming-in and breast feeding on demand.  - Encourage skin-to-skin contact.  - Provide LC support as needed.  - Assess for and manage engorgement.  - Provide breast feeding education handouts and information on community breast feeding support.   Outcome: Completed  Goal: Establishment of adequate milk supply with medication/procedure interruptions  Description: INTERVENTIONS:  - Review techniques for milk expression (breast pumping).   - Provide pumping equipment/supplies, instructions, and assistance until it is safe to breastfeed infant.  Outcome: Completed  Goal: Experiences normal breast weaning course  Description: INTERVENTIONS:  - Assess for and manage engorgement.  - Instruct on breast care.  - Provide comfort measures.  Outcome: Completed  Goal: Appropriate maternal -  bonding  Description: INTERVENTIONS:  - Assess caregiver- interactions.  - Assess caregiver's emotional status and coping mechanisms.  - Encourage caregiver to participate in  daily care.  - Assess support systems available to mother/family.  - Provide /case management support as needed.  Outcome: Completed     Problem: GASTROINTESTINAL - ADULT  Goal: Minimal or absence of nausea and vomiting  Description: INTERVENTIONS:  - Maintain adequate hydration with IV or PO as ordered and tolerated  - Nasogastric tube to low intermittent suction as ordered  - Evaluate effectiveness of ordered antiemetic medications  - Provide nonpharmacologic comfort measures as appropriate  - Advance diet as tolerated, if ordered  - Obtain nutritional consult as needed  - Evaluate fluid balance  Outcome: Completed  Goal: Maintains or returns to baseline bowel function  Description:  INTERVENTIONS:  - Assess bowel function  - Maintain adequate hydration with IV or PO as ordered and tolerated  - Evaluate effectiveness of GI medications  - Encourage mobilization and activity  - Obtain nutritional consult as needed  - Establish a toileting routine/schedule  - Consider collaborating with pharmacy to review patient's medication profile  Outcome: Completed  Goal: Maintains adequate nutritional intake (undernourished)  Description: INTERVENTIONS:  - Monitor percentage of each meal consumed  - Identify factors contributing to decreased intake, treat as appropriate  - Assist with meals as needed  - Monitor I&O, WT and lab values  - Obtain nutritional consult as needed  - Optimize oral hygiene and moisture  - Encourage food from home; allow for food preferences  - Enhance eating environment  Outcome: Completed  Goal: Achieves appropriate nutritional intake (bariatric)  Description: INTERVENTIONS:  - Monitor for over-consumption  - Identify factors contributing to increased intake, treat as appropriate  - Monitor I&O, WT and lab values  - Obtain nutritional consult as needed  - Evaluate psychosocial factors contributing to over-consumption  Outcome: Completed

## 2024-02-10 NOTE — DISCHARGE SUMMARY
[facility]    Discharge Summary    Jeannie Horton Patient Status:  Inpatient    1990 MRN [id number]   Location [facility] 3SE Attending Seamus Chi MD   Hosp Day # 4 PCP No primary care provider on file.     Admit Date: 2024    Discharge Date : 2/10/24    Attending Physician: Seamus Chi MD    Reason for Admission:  pregnancy    Procedures:  Primary low transverse  section.     Hospital Course: arrest of dilation/fetal intolerance to labor,  pod 3  pt doing well  eating well , ambulating well, good pain control. Pt requested discharge home today.       Discharge Diagnoses: There were no encounter diagnoses.    Discharged Condition: good    Disposition: home    Alert and oriented nad  Abd soft, nontender, fundus firm.  Incision c/d/I  Ext nt    Discharge counseling done     Patient Instructions:  Follow-up appointment with office in 2 weeks.       Seamus Chi MD  2/10/2024  10:04 AM

## 2024-02-12 NOTE — PAYOR COMM NOTE
--------------  DISCHARGE REVIEW    Payor: [facility]  Subscriber #:  [id number]  Authorization Number: [id number]    Admit date: 24  Admit time:   7:53 PM  Discharge Date: 2/10/2024  1:18 PM     Admitting Physician: Seamus Chi MD  Attending Physician:  No att. providers found  Primary Care Physician: No primary care provider on file.          Discharge Summary Notes        Discharge Summary signed by Seamus Chi MD at 2/10/2024 10:10 AM       Author: Seamus Chi MD Specialty: OBSTETRICS & GYNECOLOGY Author Type: Physician    Filed: 2/10/2024 10:10 AM Date of Service: 2/10/2024 10:04 AM Status: Signed    : Seamus Chi MD (Physician)           [facility]    Discharge Summary    Jeannie Horton Patient Status:  Inpatient    1990 MRN [id number]   Location [facility] 3SE Attending Seamus Chi MD   Hosp Day # 4 PCP No primary care provider on file.     Admit Date: 2024    Discharge Date : 2/10/24    Attending Physician: Seamus Chi MD    Reason for Admission:  pregnancy    Procedures:  Primary low transverse  section.     Hospital Course: arrest of dilation/fetal intolerance to labor,  pod 3  pt doing well  eating well , ambulating well, good pain control. Pt requested discharge home today.       Discharge Diagnoses: There were no encounter diagnoses.    Discharged Condition: good    Disposition: home    Alert and oriented nad  Abd soft, nontender, fundus firm.  Incision c/d/I  Ext nt    Discharge counseling done     Patient Instructions:  Follow-up appointment with office in 2 weeks.       Seamus Chi MD  2/10/2024  10:04 AM    Electronically signed by Seamus Chi MD on 2/10/2024 10:10 AM         REVIEWER COMMENTS

## 2024-02-21 ENCOUNTER — TELEPHONE (OUTPATIENT)
Dept: OBGYN CLINIC | Facility: CLINIC | Age: 34
End: 2024-02-21

## 2024-02-21 NOTE — TELEPHONE ENCOUNTER
Pt Name and  verified.  Pt delivered on . States that she has been getting chills and not sure if it could be related to her breastfeeding. Pt states that she was engorged and was able to alleviate some of that feeling but is still concerned. Unable to come in today. Advised to go to ED if symptoms worsen. Scheduled with CLAYTON tomorrow in office.

## 2024-02-21 NOTE — TELEPHONE ENCOUNTER
Pt delivered 2/7 has yellow snot coming out of nose & she is breastfeeding. Pt would like to discuss

## 2024-02-22 ENCOUNTER — OFFICE VISIT (OUTPATIENT)
Dept: OBGYN CLINIC | Facility: CLINIC | Age: 34
End: 2024-02-22

## 2024-02-22 VITALS
HEART RATE: 82 BPM | SYSTOLIC BLOOD PRESSURE: 103 MMHG | TEMPERATURE: 98 F | DIASTOLIC BLOOD PRESSURE: 67 MMHG | WEIGHT: 156 LBS | BODY MASS INDEX: 29 KG/M2

## 2024-02-22 DIAGNOSIS — O92.79: Primary | ICD-10-CM

## 2024-02-22 PROCEDURE — 99213 OFFICE O/P EST LOW 20 MIN: CPT | Performed by: STUDENT IN AN ORGANIZED HEALTH CARE EDUCATION/TRAINING PROGRAM

## 2024-02-26 ENCOUNTER — NURSE ONLY (OUTPATIENT)
Dept: LACTATION | Facility: HOSPITAL | Age: 34
End: 2024-02-26
Attending: OBSTETRICS & GYNECOLOGY
Payer: COMMERCIAL

## 2024-02-26 PROCEDURE — 99213 OFFICE O/P EST LOW 20 MIN: CPT

## 2024-02-26 NOTE — PROGRESS NOTES
SITUATION:    Jeannie & baby Jeffrey here for initial outpt appt, mom seen last week in OB with concerns of mastitis, and  she is having trouble with positioning    Background:    HX: Jeffrey Dennis has been mostly breast feeding since home, in the last week she needed to start doing some pumping, as latching was becoming challenging and having engorgement pain. She had some chills & body aches with very full breasts. OB did not prescribe any meds, and told to come back if s/s got worse. S/s got better, and today only c/o breasts seeming full, but not painful.  In the last couple of days, she is mostly on demand breast feeding pumping up to 3 times a day with spectra pump, and getting about 120 ml ave per pump session. Jeffrey last night was a little more fussy and trouble with latching, so was given pumped breast milk via bottle.     BW: 9 LB 7.3 oz  Last wt: 10 LB 7 oz  Todays wt: 10 LB 4.7 oz (4670 g)     Void/Stool: adequate    Maternal Considerations:  Jeannie still having some discomfort from her C/S, and having a hard time finding a position that is not painful for her incision.     ASSESSMENT:    Jeannie was assisted with getting into the laid back position. She states has a hard time feeding on the right side. Once positioned well, Jeffrey was able to latch with minimal help from LC. He had a good deep latch, only popped once in the beginning, and Jeannie was able to reach latch with no assistance. He had lots of active sucks and swallows, and good sucking patter. He latched well on both sides. Once Jeannie shown how to position with the pillows and adjust her hold, she was able to latch with no help on the 2nd side. He latched for about 13 minutes a side, and latched well and good active swallows on both sides. Mom shown breast compression to keep him active at the breast. Reviewed settings for the spectra, as she was only pumping in the expression phase. Jeannie states sometimes Jeffrey is gagging/chocking at the breast,  and discussed different adjustments to prevent/ help with this.     TOTAL MILK TRANSFERRED:   104 ml     RECOMMENDATION:    Jeffrey transferred great in the laid back position. Breast fed on demand 8-12 times a day. If breasts are too firm before a feeding session, can either heat & massage to soften, or pump for a couple of minutes to relieve the pressure with the hand pump. Try to avoid too much, breast feeding & pumping, but can pump in place of a feeding session. Adjust the pump settings to start 70/5 - for 1- 2 minutes, and then switch settings to the expression mode- with trying a slower cycle and higher suction. Reviewed how to use the hand pump.  If milk flow while breast feeding is too fast, take baby off and have cloth on breast to slow flow for a few minutes. Or try block feeding, latch on one side for a few feedings, and try to leave other side alone, and only do minimal pumping if have to for comfort, and then switch and few feedings on 2nd side only, to help regulate supply.  Jeffrey transferred milo today in the laid back hold, try having a pillow on either side in the chair, & your leaned back & he is diagonal across your body.     Follow up with Lactation: prn  Follow up with Pediatrician as recommended

## 2024-02-26 NOTE — PATIENT INSTRUCTIONS
PLAN FOR HOME:     Jeffrey pedraza in the laid back position. Breast fed on demand 8-12 times a day. If breasts are too firm before a feeding session, can either heat & massage to soften, or pump for a couple of minutes to relieve the pressure with the hand pump. Try to avoid too much breast feeding & pumping, which can lead to over supply. But can pump in place of a feeding session. Adjust the pump settings to start 70/5 - for 1- 2 minutes, and then switch settings to the expression mode- with trying a slower cycle and higher suction.      If milk flow while breast feeding is too fast, take baby off and have cloth on breast to slow flow for a few minutes.     Block feeding- can help if you feel he is really struggling with keeping up with the flow of your milk. You would latch on one side for a few feedings, and try to leave other side alone, and only do minimal pumping if have to for comfort, and then switch and few feedings on 2nd side only, to help regulate supply.  Jeffrey pedraza today in the laid back hold, try having a pillow on either side in the chair, & your leaned back & he is diagonal across your body.       ADDITIONAL INFORMATION:     Snuggle your baby in skin to skin contact between and during feedings whenever possible.    Massage your breasts before nursing or pumping to soften areola if needed.    Breastfeed with hunger cues: Most babies will breastfeed 8-12 times every 24 hours with some clustered breastfeeding, especially during growth spurts.     Positioning:   Baby facing mom with mouth at nipple level. Bring infant to the breast not the breast to the infant.  Make sure infant is fully turned towards you with head aligned with the shoulders for latch and arms hugging you.  Baby should approach breast reaching up with the chin lifted.  Chin is deep into the breast and nose is slightly away from breast after latch.  Make small adjustments in position for your comfort and  to help make space for the infant's nose if needed.  Use laid back position.    Deep Latching on:  Express drops of milk onto your baby’s lips to encourage latching if needed.  Aim your nipple to baby’s nose  Wait for a wide mouth and push your nipple in the mouth as you bring infant fully onto the breast.  Observe for mouth \"planted\" on the breast and for good jaw movement with suck.    Is baby taking enough breast milk?  Swallowing with most sucks (every 1-3 sucks) until satisfied at least 8-12 times every 24 hours.  Compressing the breast when your baby sucks can increase milk flow.  At least 6-8 wet diapers and at least 3-4 soft, yellow seedy stools every 24 hours. Use the breastfeeding journal to keep a record.   Weight gain of at least 4-7 ounces per week for the first 3 months after return to birth weight.      Hour of Age  Intake (mL/feed)-   if doing bottle only feeding  1st 24 hours 2-10  24-48 hours 5-15  48-72 hours 15-30  72-96 hours 30-60  Day 10: 2-3 ounces per feeding.  4 weeks: 3-4 ounces or more per feeding.    Paced bottle feeding using a slow flow nipple:     Hold your baby in an upright position, supporting the hand and neck with your hand, rather than in the crook of your arm.   Let your baby “latch on” to the bottle: stroke nipple down from top lip to bottom, licking is good, wait for wide mouth and insert nipple with lips on base.  Angle the bottle so flow is slower. If the bottle is vertical milk will flow to quickly.  Pausing mimics breastfeeding and discourages “guzzling” the feeding.        Remember the helpful website to improve your production that helps https://med.[place].edu/newborns/professional-education/breastfeeding/maximizing-milk-production.html    Breastfeeding Journal:  Write down your baby’s feedings and diapers - if not meeting the guideline for number of diapers or feedings, call your baby’s doctor.      Follow up with your OB healthcare provider:  Call if a plugged duct  or engorgement persists greater than 48 hours. Call if firm or reddened spots are present in breast with signs of fever, chills or flu like symptoms (possible breast infection/mastitis). Check your temperature during engorgement.      Care for nipples until healed:     Express drops of breast milk on nipples before and after nursing (unless nipple thrush is present).  Use a hydrogel type dressing on your nipples between feedings. (Soothies or Ameda Comfort Gel pads)  Or, use Lanolin every time after breastfeeding. (Do not combine with use of gel pads)  If too sore to nurse on one or both breasts, pump one (or both) breast(s) to comfort every 2-3 hours. If nursing to contentment on one breast, this pumped milk can be stored for future use. If not nursing on either breast, feed baby your breast milk until able to return to breast.   Discuss use of all purpose nipple ointment with your OB doctor.   Call doctor if nipple has signs of infection: red/deep pink, drainage (pus), increased pain, fever.     Follow-up:  Call lactation [phone] as needed. Schedule follow-up lactation consult as needed.   Appointment with baby’s doctor planned.  Attend Mommy and Baby Support Group.  (check website www.[email or web address].org under classes or call class registration at [phone])  Call your baby's doctor with questions as well      Call your OB doctor with any signs of   mastitis (breast infection)    Plugged area(s) are not soft within 24 hours.   Breast becomes firm, reddened, or painful.   Fever, chills, or flu-like symptoms. Check your temperature 3 times daily until a plugged duct or mastitis resolves.    If mastitis occurs:  Continue breastfeeding and/or pumping - your milk is not infected.   Continue above treatment for relieving plugged area(s)  Continue to take antibiotic as prescribed even though you may quickly feel better.   Contact your doctor is you are not feeling significantly better within 1-2 days of starting  antibiotic, sooner if symptoms worsen.      Call the lactation consultants at [phone] as needed.       [facility] has great support for our families even after discharge.  We have virtual or in-person support groups.  Visit our website for the most up-to-date info for our many different support groups. [email or web address]/services/[email or web address]-[email or web address]/resources/       Outpatient Lactation appoints.  Call [phone]- to schedule an appt.  Our office is located in the Maternal Fetal Medicine office next to [facility] on the first floor.      New Moms Support Groups  Our weekly New Mom Support Groups are for any new parents in our community. They are led by an experienced Mother/Baby nurse or IBCLC and usually include a guest speaker on a topic of interest to new parents. These in-person groups also include Breastfeeding Support at each meeting. Bring your baby ( - 6 months) with you! Moms-to-be are also welcome! All mom's welcome even if its not your first.     MOM & BABY HOUR   Meets most  10:00 - 11:30 a.m.  Masks are not required, but be considerate of others and do not attend if mom or baby have had any symptoms of illness within the previous 24 hours. Breastfeeding support will be included at each session--just ask the leader any breastfeeding questions you may have. Location [facility] [place] [address] [place] Go inside the front door and to the right to the “Community Education Room”.    Mom's Line: [phone]   This service is provided by [facility], has a phone line dedicated women (or anyone worried about a women) who may be experiencing signs or symptoms of postpartum depression.    Nurturing Mom- A support group for new and expectant moms looking for support with the transition to parenthood as well as those experiencing symptoms of  anxiety and/or depression.  Please contact  [email or web address] if you need directions or the link for the virtual meetings. Please contact [email or web address] if you plan to attend, but please be considerate of others and do not attend if mom or baby have had any symptoms of illness within the previous 24 hours.     La L[employer] League for breast feeding and parent support, Website: [employer].com  and for the [place] group and other groups visit https://www.facebook.com/pg/Marshal/events/.  to help find a group, all meetings are virtual.     Facebook groups-  for more support when home- Babies & Mommies [facility] --- you can find mom-to-mom advice and the list of speaker topics for cradle talk program.     Helpful websites:    www.llli.org  [email or web address]  [email or web address]

## 2024-03-04 ENCOUNTER — POSTPARTUM (OUTPATIENT)
Dept: OBGYN CLINIC | Facility: CLINIC | Age: 34
End: 2024-03-04

## 2024-03-04 VITALS
WEIGHT: 152 LBS | BODY MASS INDEX: 28.7 KG/M2 | SYSTOLIC BLOOD PRESSURE: 110 MMHG | HEIGHT: 61 IN | DIASTOLIC BLOOD PRESSURE: 76 MMHG

## 2024-03-04 NOTE — PROGRESS NOTES
HPI:   Jeannie Horton is a 33 year old female who presents for a pp visit counseled on contraception options.     Wt Readings from Last 6 Encounters:   03/04/24 152 lb (68.9 kg)   02/22/24 156 lb (70.8 kg)   02/06/24 180 lb (81.6 kg)   01/29/24 176 lb 12.8 oz (80.2 kg)   01/19/24 177 lb (80.3 kg)   01/12/24 176 lb (79.8 kg)     Body mass index is 28.72 kg/m².     No results found for: \"CHOLEST\", \"HDL\", \"LDL\", \"TRIGLY\", \"AST\", \"ALT\"     Current Outpatient Medications   Medication Sig Dispense Refill    Prenatal 27-0.8 MG Oral Tab Take 1 tablet by mouth daily.      gabapentin 300 MG Oral Cap Take 1 capsule (300 mg total) by mouth every 8 (eight) hours as needed (For breakthrough moderate pain). (Patient not taking: Reported on 3/4/2024) 12 capsule 0    docusate sodium 100 MG Oral Cap Take 100 mg by mouth 2 (two) times daily as needed for constipation. (Patient not taking: Reported on 3/4/2024) 30 capsule 1    ibuprofen 600 MG Oral Tab Take 1 tablet (600 mg total) by mouth every 6 (six) hours as needed. (Patient not taking: Reported on 3/4/2024) 40 tablet 0    acetaminophen 500 MG Oral Tab Take 1 tablet (500 mg total) by mouth every 6 (six) hours as needed for Pain. (Patient not taking: Reported on 3/4/2024) 40 tablet 1    Ferrous Sulfate 325 (65 Fe) MG Oral Tab Take 1 tablet (325 mg total) by mouth every other day. (Patient not taking: Reported on 2/22/2024) 90 tablet 3      Past Medical History:   Diagnosis Date    Anemia       Past Surgical History:   Procedure Laterality Date    WISDOM TEETH REMOVED        No family history on file.   Social History:   Social History     Socioeconomic History    Marital status: Single   Tobacco Use    Smoking status: Never    Smokeless tobacco: Never   Substance and Sexual Activity    Alcohol use: Not Currently     Alcohol/week: 1.0 standard drink of alcohol     Types: 1 Standard drinks or equivalent per week     Comment: socially    Drug use: Never     Social Determinants of  Health     Financial Resource Strain: Unknown (2/6/2024)    Financial Resource Strain     Difficulty of Paying Living Expenses: Patient declined     Med Affordability: Yes   Food Insecurity: No Food Insecurity (2/6/2024)    Food Insecurity     Food Insecurity: Never true   Transportation Needs: No Transportation Needs (2/6/2024)    Transportation Needs     Lack of Transportation: No   Stress: No Stress Concern Present (2/6/2024)    Stress     Feeling of Stress : No   Housing Stability: Low Risk  (2/6/2024)    Housing Stability     Housing Instability: No            REVIEW OF SYSTEMS:   GENERAL: feels well otherwise  SKIN: denies any unusual skin lesions  EYES:denies blurred vision or double vision  HEENT: denies nasal congestion, sinus pain or ST  LUNGS: denies shortness of breath with exertion  CARDIOVASCULAR: denies chest pain on exertion  GI: denies abdominal pain,denies heartburn  : denies dysuria, vaginal discharge or itching,periods regular   MUSCULOSKELETAL: denies back pain  NEURO: denies headaches  PSYCHE: denies depression or anxiety  HEMATOLOGIC: denies hx of anemia  ENDOCRINE: denies thyroid history  ALL/ASTHMA: denies hx of allergy or asthma    EXAM:   /76   Ht 5' 1\" (1.549 m)   Wt 152 lb (68.9 kg)   LMP 04/30/2023 (Exact Date)   Breastfeeding Yes   BMI 28.72 kg/m²   Body mass index is 28.72 kg/m².   GENERAL: well developed, well nourished,in no apparent distress  SKIN: no rashes,no suspicious lesions  HEENT: atraumatic, normocephalic  EYES:normal in appearance  NECK: supple,no adenopathy  CHEST: no chest tenderness  BREAST: def pt  LUNGS: clear to auscultation  CARDIO: RRR without murmur  GI: good BS's,no masses, HSM or tenderness  :introitus is normal,scant discharge,cervix is pink,no adnexal masses or tenderness    MUSCULOSKELETAL: back is not tender,FROM of the back  EXTREMITIES: no cyanosis, clubbing or edema  NEURO: Oriented times three      ASSESSMENT AND PLAN:   Jeannie Horton is  a 33 year old female who presents for a pp visit.  . Self breast exam explained. Health maintenance. Body mass index is 28.72 kg/m²., recommended low fat diet and aerobic exercise 30 minutes three times weekly.  The patient indicates understanding of these issues and agrees to the plan.  The patient is asked to return for an annual visit.

## 2024-03-22 ENCOUNTER — TELEPHONE (OUTPATIENT)
Dept: OBGYN UNIT | Facility: HOSPITAL | Age: 34
End: 2024-03-22

## 2024-04-12 ENCOUNTER — TELEPHONE (OUTPATIENT)
Dept: OBGYN CLINIC | Facility: CLINIC | Age: 34
End: 2024-04-12

## 2024-04-12 NOTE — TELEPHONE ENCOUNTER
Patient would like to schedule an IUD insert. Period has not started post partum. Please advise.

## 2024-04-12 NOTE — TELEPHONE ENCOUNTER
Pt Name and  verified.  Pt states that she'd like to schedule an IUD insertion with Dr. Camilo. Pt offered apt in May as she prefers the [facility] [place].   Pt scheduled for 24

## 2024-04-18 ENCOUNTER — TELEMEDICINE (OUTPATIENT)
Dept: INTERNAL MEDICINE CLINIC | Facility: CLINIC | Age: 34
End: 2024-04-18
Payer: MEDICAID

## 2024-04-18 DIAGNOSIS — L60.9 ABNORMALITY OF NAIL SURFACE: Primary | ICD-10-CM

## 2024-04-18 PROCEDURE — 99214 OFFICE O/P EST MOD 30 MIN: CPT | Performed by: INTERNAL MEDICINE

## 2024-04-18 NOTE — PROGRESS NOTES
Virtual Virtual Check-In    Jeannie Horton verbally consents to a Virtual Video Check-In service on 04/18/24. Patient understands and accepts financial responsibility for any deductible, co-insurance and/or co-pays associated with this service. This visit is conducted using Telemedicine with live, interactive video and audio.     I spoke with Jeannie Horton by secure video chat, verified date of birth, and discussed their current concerns:   Duration: 20 min    HPI    Breastfeeding her 3 months old baby  Has been feeling anxious and biting her nails  Noticed Grey black discoloration at the bases of her bilateral finger nails that started 2 weeks ago  Patient is worried that what she is having is a fungal infection is requesting treatment before \"it spreads\"        Review of Systems:   Review of Systems   Constitutional: Negative.    HENT: Negative.     Eyes: Negative.    Respiratory: Negative.     Cardiovascular: Negative.    Gastrointestinal: Negative.    Endocrine: Negative.    Genitourinary: Negative.    Musculoskeletal: Negative.    Allergic/Immunologic: Negative.    Neurological: Negative.    Hematological: Negative.    Psychiatric/Behavioral: Negative.                    Reviewed Active Problems:  Patient Active Problem List    Diagnosis    Postpartum care and examination of lactating mother (HCC)    Pregnancy (HCC)    Post-dates pregnancy (HCC)      Reviewed Past Medical History:  Past Medical History:    Anemia      Reviewed Family History:  No family history on file.    Reviewed Social History:  Social History     Socioeconomic History    Marital status: Single   Tobacco Use    Smoking status: Never    Smokeless tobacco: Never   Substance and Sexual Activity    Alcohol use: Not Currently     Alcohol/week: 1.0 standard drink of alcohol     Types: 1 Standard drinks or equivalent per week     Comment: socially    Drug use: Never     Social Determinants of Health     Financial Resource Strain: Unknown  (2/6/2024)    Financial Resource Strain     Difficulty of Paying Living Expenses: Patient declined     Med Affordability: Yes   Food Insecurity: No Food Insecurity (2/6/2024)    Food Insecurity     Food Insecurity: Never true   Transportation Needs: No Transportation Needs (2/6/2024)    Transportation Needs     Lack of Transportation: No   Stress: No Stress Concern Present (2/6/2024)    Stress     Feeling of Stress : No   Housing Stability: Low Risk  (2/6/2024)    Housing Stability     Housing Instability: No      Reviewed Current Medications:  Current Outpatient Medications   Medication Sig Dispense Refill    gabapentin 300 MG Oral Cap Take 1 capsule (300 mg total) by mouth every 8 (eight) hours as needed (For breakthrough moderate pain). (Patient not taking: Reported on 3/4/2024) 12 capsule 0    docusate sodium 100 MG Oral Cap Take 100 mg by mouth 2 (two) times daily as needed for constipation. (Patient not taking: Reported on 3/4/2024) 30 capsule 1    ibuprofen 600 MG Oral Tab Take 1 tablet (600 mg total) by mouth every 6 (six) hours as needed. (Patient not taking: Reported on 3/4/2024) 40 tablet 0    acetaminophen 500 MG Oral Tab Take 1 tablet (500 mg total) by mouth every 6 (six) hours as needed for Pain. (Patient not taking: Reported on 3/4/2024) 40 tablet 1    Prenatal 27-0.8 MG Oral Tab Take 1 tablet by mouth daily.      Ferrous Sulfate 325 (65 Fe) MG Oral Tab Take 1 tablet (325 mg total) by mouth every other day. (Patient not taking: Reported on 2/22/2024) 90 tablet 3          Physical Exam  There were no vitals filed for this visit.  Physical Exam   Constitutional:       General: He is not in acute distress.     Appearance: Normal appearance.   HENT:      Head: Normocephalic and atraumatic.      Nose: Nose normal.   Neurological:      Mental Status: He is alert and oriented to person, place, and time.   Psychiatric:         Behavior: Behavior normal.         Thought Content: Thought content normal.          Judgment: Judgment normal.       Diagnosis:  1. Abnormality of nail surface  -Discussed with the patient that those findings could be secondary to melanonychia or splinter hemorrhages secondary to fingernail biting rather than onychomycosis.  I also discussed with her that systematic antifungals are not recommended during breast-feeding.  As for topical once ciclopirox is not recommended during breast-feeding.  Topical efinaconazole has not been studied in breast-feeding women although it is unlikely to be observed in significant amounts and blood.  Given that risk of treatment may outweigh the benefits specially when it does not seem clear that she has onychomycosis I instructed the patient to follow-up with dermatology to confirm the diagnosis before she gets treatment  - Derm Referral - [facility] (Nikki)         Follow up: No follow-ups on file.    Please note that the following visit was completed using two-way, real-time interactive audio and/or video communication.  This has been done in good peter to provide continuity of care in the best interest of the provider-patient relationship, due to the ongoing public health crisis/national emergency and because of restrictions of visitation.  There are limitations of this visit as no physical exam could be performed.  Every conscious effort was taken to allow for sufficient and adequate time.  This billing was spent on reviewing labs, medications, radiology tests and decision making.  Appropriate medical decision-making and tests are ordered as detailed in the plan of care above. Jeannie Horton understands video evaluation is not a substitute for in person examination or emergency care. Patient advised to go to ER or call 911 for worsening symptoms or acute distress.     This note was prepared using Dragon Medical voice recognition dictation software. As a result errors may occur. When identified these errors have been corrected. While every attempt is made to  correct errors during dictation discrepancies may still exist.  Donna Grimes MD

## 2024-04-23 ENCOUNTER — TELEPHONE (OUTPATIENT)
Dept: OBGYN CLINIC | Facility: CLINIC | Age: 34
End: 2024-04-23

## 2024-04-23 NOTE — TELEPHONE ENCOUNTER
Patient verified name and     Patient informed of  visit needing to be re-scheduled due to provider schedule change. Patient prefers to see TA at [place] office. Appt r/s 6/10. Aware of scheduling details.

## 2024-04-23 NOTE — TELEPHONE ENCOUNTER
Patient is scheduled for IUD insert on 5/6 with TA, due to a change on TA schedule appointment needs to be rescheduled, patient has not been informed yet. Please advise

## 2024-05-15 ENCOUNTER — TELEPHONE (OUTPATIENT)
Dept: OBGYN CLINIC | Facility: CLINIC | Age: 34
End: 2024-05-15

## 2024-05-15 NOTE — TELEPHONE ENCOUNTER
Patient verified name and date of birth.    Patient reports flying yesterday, was having stomach pains that come and go unsure if related to  24. Patient reports no pain at incision site, occasional redness. Patient also reports symptoms of vaginal discharge. Unsure if related to a UTI. States discharge is thicker in consistency with odor. Patient is currently out of town for the next 2 weeks. Encouraged testing at an Urgent care or Immediate care if urinary symptoms present. Discussed over the counter monistat if symptoms related to yeast infection. Verbalized understanding and agreed.

## 2024-05-15 NOTE — TELEPHONE ENCOUNTER
Patient 13-14 post partum () and took a 3-1/2 hr plane flight and having center stomach pains.    Patient would like to discuss

## 2024-05-31 ENCOUNTER — TELEPHONE (OUTPATIENT)
Dept: OBGYN CLINIC | Facility: CLINIC | Age: 34
End: 2024-05-31

## 2024-05-31 NOTE — TELEPHONE ENCOUNTER
Patient verified name and date of birth.    Patient requesting an appointment on 6/14 for an IUD placement. Patient scheduled 6/14 with Mira. Aware of scheduling details.

## 2024-05-31 NOTE — TELEPHONE ENCOUNTER
Patient called to reschedule 6/10 IUD placement. She would like to be seen on 6/14 with Dr. Ton kunz but will see someone else if necessary. Please call.

## 2024-06-14 ENCOUNTER — OFFICE VISIT (OUTPATIENT)
Dept: OBGYN CLINIC | Facility: CLINIC | Age: 34
End: 2024-06-14

## 2024-06-14 VITALS
BODY MASS INDEX: 26.24 KG/M2 | SYSTOLIC BLOOD PRESSURE: 104 MMHG | DIASTOLIC BLOOD PRESSURE: 69 MMHG | HEIGHT: 61 IN | WEIGHT: 139 LBS | HEART RATE: 71 BPM

## 2024-06-14 DIAGNOSIS — Z01.812 PRE-PROCEDURAL LABORATORY EXAMINATION: Primary | ICD-10-CM

## 2024-06-14 DIAGNOSIS — Z30.430 ENCOUNTER FOR INSERTION OF INTRAUTERINE CONTRACEPTIVE DEVICE (IUD): ICD-10-CM

## 2024-06-14 LAB
CONTROL LINE PRESENT WITH A CLEAR BACKGROUND (YES/NO): YES YES/NO
KIT LOT #: NORMAL NUMERIC
PREGNANCY TEST, URINE: NEGATIVE

## 2024-06-14 PROCEDURE — 81025 URINE PREGNANCY TEST: CPT | Performed by: STUDENT IN AN ORGANIZED HEALTH CARE EDUCATION/TRAINING PROGRAM

## 2024-06-14 PROCEDURE — 58300 INSERT INTRAUTERINE DEVICE: CPT | Performed by: STUDENT IN AN ORGANIZED HEALTH CARE EDUCATION/TRAINING PROGRAM

## 2024-06-14 NOTE — PROGRESS NOTES
[facility]  Obstetrics and Gynecology  IUD Insertion Procedure Note  Mira Gonzalez PA-C    Jeannie Horton is a 33 year old female presenting for IUD Insertion.     IUD Insertion:     Pregnancy Results: negative from urine test   Birth control method(s) used:  none  Pt's LMP was Patient's last menstrual period was 04/30/2023 (exact date).    Pt counseled on risks of IUD insertion including infection, PID/infertility, bleeding and uterine perforation.  Discussed side effects of Mirena IUD including cramping, bleeding.  Pt understands and has signed consent.    Pelvic Exam Findings:  Cervix normal. Uterus anteverted.     Procedure:  Speculum placed in the vagina.  Betadine wash of vagina and cervix.  Single tooth tenaculum was placed at the 12 o'clock position.  Uterus sounded to 8 cm.  Mirena IUD was placed without difficulty.  Strings cut at 3 cm.  Single tooth tenaculum removed.  Patient tolerated procedure well.    Visit Plan:  IUD surveillance was discussed with the patient.  Pt to follow up in 6 weeks for IUD check.    MIRA GONZALEZ PA-C  1:12 PM  6/14/2024

## 2024-06-17 ENCOUNTER — TELEPHONE (OUTPATIENT)
Dept: OBGYN CLINIC | Facility: CLINIC | Age: 34
End: 2024-06-17

## 2024-06-17 NOTE — TELEPHONE ENCOUNTER
Name and Date of Birth verified      Patient reports new onset pinching pain/ sensation in her anal area. Patient does note constipation.     Patient reports slimy clear discharge and cramping.       Patient offered in office appointment for assessment but unable to accept due to being out of town. Patient advised to try over the counter pain reliever and see if improvement with bowel movement. Patient advised to go to urgent care if pain does not improve or worsens.

## 2024-06-17 NOTE — TELEPHONE ENCOUNTER
Patient called in regarding the IUD insert, she is having some discharge, stomach pain, request for a nurse to call for guidance

## 2024-08-30 ENCOUNTER — OFFICE VISIT (OUTPATIENT)
Dept: OBGYN CLINIC | Facility: CLINIC | Age: 34
End: 2024-08-30

## 2024-08-30 VITALS
DIASTOLIC BLOOD PRESSURE: 70 MMHG | BODY MASS INDEX: 25.79 KG/M2 | SYSTOLIC BLOOD PRESSURE: 105 MMHG | WEIGHT: 136.63 LBS | HEIGHT: 61 IN

## 2024-08-30 DIAGNOSIS — T83.32XA INTRAUTERINE CONTRACEPTIVE DEVICE THREADS LOST, INITIAL ENCOUNTER: ICD-10-CM

## 2024-08-30 DIAGNOSIS — Z30.431 IUD CHECK UP: Primary | ICD-10-CM

## 2024-08-30 PROCEDURE — 99212 OFFICE O/P EST SF 10 MIN: CPT | Performed by: STUDENT IN AN ORGANIZED HEALTH CARE EDUCATION/TRAINING PROGRAM

## 2024-08-30 NOTE — PROGRESS NOTES
IUD Check      HPI:   Pt is a 33 year old who is here for follow up of IUD placement.  She had a Mirena IUD placed on 6/17/24.  Since insertion patient has been noting mild cramping but irregular bleeding, stating she will get her period twice a month. States partner felt strings once. Patient cannot feel strings herself. Did not notice device fall out.     ROS:   No abnormal vaginal discharge, odor, irritation, or itching. No pelvic pain.    PHYSICAL EXAM:   /70   Ht 5' 1\" (1.549 m)   Wt 136 lb 9.6 oz (62 kg)   LMP 08/23/2024 (Exact Date)   BMI 25.81 kg/m²     GENERAL: well developed, well nourished, in no apparent distress  GYNE/: External Genitalia: Normal appearing, no lesions. Urethral meatus appear wnl, no abnormal discharge or lesions noted.                      Vagina: normal pink mucosa, no lesions, normal clear discharge.                      Cervix: Normal, IUD string no visible     ASSESSMENT:        ICD-10-CM    1. IUD check up  Z30.431 US PELVIS (TRANSABDOMINAL PELVIS)  (CPT=76856)      2. Intrauterine contraceptive device threads lost, initial encounter  T83.32XA US PELVIS (TRANSABDOMINAL PELVIS)  (CPT=76856)          PLAN:   - Will follow-up with pelvic ultrasound to ensure IUD placement.    IMAGING/ REFERRALS:    US PELVIS (TRANSABDOMINAL PELVIS)  (CPT=76856)     BÁRBARA GONZALEZ PA-C  8/30/2024  2:30 PM

## 2024-10-07 ENCOUNTER — OFFICE VISIT (OUTPATIENT)
Dept: OBGYN CLINIC | Facility: CLINIC | Age: 34
End: 2024-10-07

## 2024-10-07 VITALS — HEART RATE: 72 BPM | DIASTOLIC BLOOD PRESSURE: 66 MMHG | SYSTOLIC BLOOD PRESSURE: 101 MMHG

## 2024-10-07 DIAGNOSIS — R30.0 DYSURIA: ICD-10-CM

## 2024-10-07 DIAGNOSIS — T83.32XD INTRAUTERINE CONTRACEPTIVE DEVICE THREADS LOST, SUBSEQUENT ENCOUNTER: ICD-10-CM

## 2024-10-07 DIAGNOSIS — N89.8 VAGINAL PRURITUS: Primary | ICD-10-CM

## 2024-10-07 LAB
APPEARANCE: CLEAR
BILIRUB UR QL: NEGATIVE
BILIRUBIN: NEGATIVE
CLARITY UR: CLEAR
COLOR UR: COLORLESS
GLUCOSE (URINE DIPSTICK): NEGATIVE MG/DL
GLUCOSE UR-MCNC: NORMAL MG/DL
HGB UR QL STRIP.AUTO: NEGATIVE
KETONES (URINE DIPSTICK): NEGATIVE MG/DL
KETONES UR-MCNC: NEGATIVE MG/DL
LEUKOCYTE ESTERASE UR QL STRIP.AUTO: NEGATIVE
LEUKOCYTES: NEGATIVE
MULTISTIX LOT#: NORMAL NUMERIC
NITRITE UR QL STRIP.AUTO: NEGATIVE
NITRITE, URINE: NEGATIVE
OCCULT BLOOD: NEGATIVE
PH UR: 7 [PH] (ref 5–8)
PH, URINE: 6.5 (ref 4.5–8)
PROT UR-MCNC: NEGATIVE MG/DL
SP GR UR STRIP: 1 (ref 1–1.03)
SPECIFIC GRAVITY: 1.01 (ref 1–1.03)
URINE-COLOR: YELLOW
UROBILINOGEN UR STRIP-ACNC: NORMAL
UROBILINOGEN,SEMI-QN: 0.2 MG/DL (ref 0–1.9)

## 2024-10-07 RX ORDER — LORAZEPAM 0.5 MG/1
0.5 TABLET ORAL AS DIRECTED
Qty: 1 TABLET | Refills: 0 | Status: SHIPPED | OUTPATIENT
Start: 2024-10-07

## 2024-10-07 RX ORDER — HYDROCODONE BITARTRATE AND ACETAMINOPHEN 5; 325 MG/1; MG/1
1 TABLET ORAL AS DIRECTED
Qty: 1 TABLET | Refills: 0 | Status: SHIPPED | COMMUNITY
Start: 2024-10-07

## 2024-10-07 RX ORDER — TRIAMCINOLONE ACETONIDE 1 MG/G
1 OINTMENT TOPICAL NIGHTLY
Qty: 15 G | Refills: 0 | Status: SHIPPED | OUTPATIENT
Start: 2024-10-07

## 2024-10-07 RX ORDER — NYSTATIN 100000 U/G
1 CREAM TOPICAL 2 TIMES DAILY
Qty: 30 G | Refills: 0 | Status: SHIPPED | OUTPATIENT
Start: 2024-10-07

## 2024-10-07 NOTE — PATIENT INSTRUCTIONS
Vulvar Care     Cleaning:  -use plain warm (not hot) water (no soap) to wash if needed or can take Sitz bath (see below)  -use water, fingers, & only very gentle, fragrance-free, moisturizing cleanser      (e.g. Cetaphil or CeraVe hydrating or gentle cleansers meant for eczema/psoriasis & faces)  -only pat dry gently (no rubbing)     Sitz baths:  -soothing and cleansing  -Get a Sitz Bath from [facility] or [facility]--fits over toilet, you can fill with water to soak vulva without having to get in bathtub  -Use 1-3 times per day for ten minutes at a time  -Pat dry, apply thin layer of vaseline to protect the skin     Protection/Prevention:  -goal is to maintain an intact, moist (non-dehydrated) skin barrier  -need to prevent irritation & over-drying of skin that can lead to micro-tears, cracking, and itching, pain, & bleeding.  -do not scratch or wipe hard (mechanical injury)  -avoid strong chemicals/fragrances (fragrance free soap & detergents, avoid fabric softeners)  -avoid other irritants (e.g. sweat, leaked urine, leaked stool)  -avoid excessive friction (no thongs, always use lubricant for intercourse, daily barrier cream or ointment)  -breathable underwear, change underwear if sweaty/wet, no underwear while sleeping if possible.  -DO USE a barrier product for protection       (e.g. Aquaphor, vaseline, A&D ointment, Zinc oxide, diaper rash cream) at least once daily  -DO NOT use any instrument (including fingers) in the anus first and then in the vagina. This will cause a bacterial infection of the vagina.     Treatment:  -BEST TREATMENT IS PREVENTION  -ointments are generally more potent than creams  -use just a thin layer  -during treatment (usually at least 2 wk) it is best to avoid intercourse to avoid trauma to the skin  -if diagnosed with chronic inflammatory skin condition (e.g. lichen sclerosis)         Most important is avoiding scratching & irritants         Work with gynecologist to form a steroid  regimen for long term use         Often will need daily strong topical steroid (prescription) for at least 12 weeks.           Best to try to taper down to 2-3 times per week or weekly if able & can increase use in a flare  -if sensitive/dry skin         Add back some oils &/or moisture on a regular basis         Coconut oil is pure (no irritants) & contains ceramides (fatty acids) that are             important for maintaining skin barrier         Hyaluronic acid (there are suppositories for intravaginal use e.g Revaree)         Vaginal moisturizer products can be used topically as well (e.g. Replens, Luvena)         Still recommend a barrier product in addition (on top of the above)     If you are tempted to scratch:  -place ice pack on area for 15-20 minutes & distract yourself.  -if needed can (sparingly) place a thin layer of lidocaine ointment or cream        (e.g. Bikini-zone, etc over the counter)  -can take an oral anti-histamine (e.g. Benadryl, Claritin, Zyrtec, etc)  -can also use a topical steroid (hydrocortisone ointment over the counter) for a few days        Take care - chronic steroid use can cause skin thinning & can worsen your problem.

## 2024-10-07 NOTE — PROGRESS NOTES
[facility]  Obstetrics and Gynecology  Focused Gynecology Problem Exam      Jeannie Horton is a 34 year old female presenting for UTI (Possible uti, pt states has pelvic pain, vaginal itch X 3 days )  .    HPI:     Chief Complaint   Patient presents with    UTI     Possible uti, pt states has pelvic pain, vaginal itch X 3 days      Was out of town and unable to get pelvic US for lost IUD strings    Pain started today  But vaginal itching started 3 days ago  Has not tried anything otc    Mild dysuria but not increased urge or frequency   Denies abnormal dc    Period Cycle (Days): 28 (10/7/2024 10:45 AM)  Period Duration (Days): 5 (10/7/2024 10:45 AM)  Period Flow: heavy (10/7/2024 10:45 AM)  Use of Birth Control (if yes, specify type): Mirena IUD (10/7/2024 10:45 AM)  Hx Prior Abnormal Pap: No (last pap 2023 per pt) (10/9/2023  3:31 PM)      Medications (Active prior to today's visit):  Current Outpatient Medications   Medication Sig Dispense Refill    nystatin 100,000 Units/g External Cream Apply 1 Application topically 2 (two) times daily. 30 g 0    triamcinolone 0.1 % External Ointment Apply 1 Application topically nightly. 15 g 0    gabapentin 300 MG Oral Cap Take 1 capsule (300 mg total) by mouth every 8 (eight) hours as needed (For breakthrough moderate pain). (Patient not taking: Reported on 10/7/2024) 12 capsule 0    docusate sodium 100 MG Oral Cap Take 100 mg by mouth 2 (two) times daily as needed for constipation. (Patient not taking: Reported on 10/7/2024) 30 capsule 1    ibuprofen 600 MG Oral Tab Take 1 tablet (600 mg total) by mouth every 6 (six) hours as needed. (Patient not taking: Reported on 10/7/2024) 40 tablet 0    acetaminophen 500 MG Oral Tab Take 1 tablet (500 mg total) by mouth every 6 (six) hours as needed for Pain. (Patient not taking: Reported on 10/7/2024) 40 tablet 1    Prenatal 27-0.8 MG Oral Tab Take 1 tablet by mouth daily. (Patient not taking: Reported on 10/7/2024)      Ferrous Sulfate 325  (65 Fe) MG Oral Tab Take 1 tablet (325 mg total) by mouth every other day. (Patient not taking: Reported on 10/7/2024) 90 tablet 3     Allergies:  No Known Allergies  HISTORY:     OB History    Para Term  AB Living   1 1 1     1   SAB IAB Ectopic Multiple Live Births         0 1      # Outcome Date GA Lbr Dom/2nd Weight Sex Type Anes PTL Lv   1 Term 24 40w3d  9 lb 7.3 oz (4.29 kg) M Caesarean Spinal, EPI N CONOR      Complications: Group B beta strep +, Decreased FHR variability         Past Medical History:    Anemia       Past Surgical History:   Procedure Laterality Date    [place] teeth removed         History reviewed. No pertinent family history.    Social History     Socioeconomic History    Marital status: Single     Spouse name: Not on file    Number of children: Not on file    Years of education: Not on file    Highest education level: Not on file   Occupational History    Not on file   Tobacco Use    Smoking status: Never    Smokeless tobacco: Never   Substance and Sexual Activity    Alcohol use: Not Currently     Alcohol/week: 1.0 standard drink of alcohol     Types: 1 Standard drinks or equivalent per week     Comment: socially    Drug use: Never    Sexual activity: Not on file   Other Topics Concern    Not on file   Social History Narrative    Not on file     Social Determinants of Health     Financial Resource Strain: Unknown (2024)    Financial Resource Strain     Difficulty of Paying Living Expenses: Patient declined     Med Affordability: Yes   Food Insecurity: No Food Insecurity (2024)    Food Insecurity     Food Insecurity: Never true   Transportation Needs: No Transportation Needs (2024)    Transportation Needs     Lack of Transportation: No   Stress: No Stress Concern Present (2024)    Stress     Feeling of Stress : No   Housing Stability: Low Risk  (2024)    Housing Stability     Housing Instability: No     Housing Instability Emergency: Not on file      Crib or Opaltte: Not on file       ROS:   Review of Systems:    Constitutional:    denies fever / chills  Eyes:     denies blurred or double vision  Cardiovascular:  denies chest pain or palpitations  Respiratory:    denies shortness of breath  Gastrointestinal:  denies severe abdominal pain, frequent diarrhea or constipation, nausea / vomiting  Genitourinary:    denies dysuria, bothersome incontinence  Skin/Breast:   denies any breast pain, lumps, or discharge  Neurological:    denies frequent severe headaches  Psychiatric:   denies depression or anxiety, thoughts of harming self or others  Heme/Lymph:    denies easy bruising or bleeding  PHYSICAL EXAM:   /66   Pulse 72   LMP 09/25/2024 (Exact Date)   Breastfeeding Yes     GENERAL: well developed, well nourished, in no apparent distress  ABDOMEN: Soft, non distended; non tender, no masses  GYNE/: External Genitalia: Normal appearing, no lesions, +b/l labia minor erythema, no excoriations. Urethral meatus appear wnl, no abnormal discharge or lesions noted.          Bladder: well supported, urethra wnl, no lesions or fissures                     Vagina: normal pink mucosa, no lesions, normal clear discharge.                      Uterus: retroflexed, mobile, non tender, normal size                     Cervix: Normal, IUD strings not seen on exam                     Adnexa: non tender, no masses, normal size   TVUS:  Uterus retroverted, IUD appears low lying, not at fundus, normal appearing b/l ovaries    ASSESSMENT:    Vaginal swab done, suspect candida  Mycolog given  Will send mychart with results  Discussed low lying IUD. Upt neg today  Discussed removal and reinsertion of IUD      ICD-10-CM    1. Vaginal pruritus  N89.8 Vaginitis Vaginosis PCR Panel     nystatin 100,000 Units/g External Cream     triamcinolone 0.1 % External Ointment      2. Dysuria  R30.0 POC Urinalysis, Manual Dip without microscopy [[id number]]     Urinalysis with Culture Reflex      CANCELED: Urine Culture, Routine      3. Intrauterine contraceptive device threads lost, subsequent encounter [T83.32XD]  T83.32XD           PLAN:   Rtc for endosee removal of IUD and IUD insertion     ORDERS:     Orders Placed This Encounter   Procedures    POC Urinalysis, Manual Dip without microscopy [[id number]]    Urinalysis with Culture Reflex    Vaginitis Vaginosis PCR Panel     PRESCRIPTIONS:     Requested Prescriptions     Signed Prescriptions Disp Refills    nystatin 100,000 Units/g External Cream 30 g 0     Sig: Apply 1 Application topically 2 (two) times daily.    triamcinolone 0.1 % External Ointment 15 g 0     Sig: Apply 1 Application topically nightly.     IMAGING/ REFERRALS:    None     Gaby Negron MD  10/7/2024  10:50 AM

## 2024-10-08 LAB
BV BACTERIA DNA VAG QL NAA+PROBE: NEGATIVE
C GLABRATA DNA VAG QL NAA+PROBE: NEGATIVE
C KRUSEI DNA VAG QL NAA+PROBE: NEGATIVE
CANDIDA DNA VAG QL NAA+PROBE: NEGATIVE
T VAGINALIS DNA VAG QL NAA+PROBE: NEGATIVE

## 2024-10-23 DIAGNOSIS — T83.32XD INTRAUTERINE CONTRACEPTIVE DEVICE THREADS LOST, SUBSEQUENT ENCOUNTER: ICD-10-CM

## 2024-10-23 RX ORDER — HYDROCODONE BITARTRATE AND ACETAMINOPHEN 5; 325 MG/1; MG/1
1 TABLET ORAL AS DIRECTED
Qty: 1 TABLET | Refills: 0 | Status: SHIPPED | OUTPATIENT
Start: 2024-10-23

## 2024-10-23 RX ORDER — LORAZEPAM 0.5 MG/1
0.5 TABLET ORAL AS DIRECTED
Qty: 1 TABLET | Refills: 0 | Status: SHIPPED | OUTPATIENT
Start: 2024-10-23

## 2024-10-24 ENCOUNTER — HOSPITAL ENCOUNTER (OUTPATIENT)
Dept: GENERAL RADIOLOGY | Facility: HOSPITAL | Age: 34
Discharge: HOME | End: 2024-10-24
Attending: STUDENT IN AN ORGANIZED HEALTH CARE EDUCATION/TRAINING PROGRAM
Payer: MEDICAID

## 2024-10-24 ENCOUNTER — TELEPHONE (OUTPATIENT)
Dept: OBGYN CLINIC | Facility: CLINIC | Age: 34
End: 2024-10-24

## 2024-10-24 ENCOUNTER — OFFICE VISIT (OUTPATIENT)
Dept: OBGYN CLINIC | Facility: CLINIC | Age: 34
End: 2024-10-24
Payer: MEDICAID

## 2024-10-24 VITALS — HEART RATE: 85 BPM | DIASTOLIC BLOOD PRESSURE: 77 MMHG | SYSTOLIC BLOOD PRESSURE: 113 MMHG

## 2024-10-24 DIAGNOSIS — T83.32XD INTRAUTERINE CONTRACEPTIVE DEVICE THREADS LOST, SUBSEQUENT ENCOUNTER: ICD-10-CM

## 2024-10-24 DIAGNOSIS — T83.32XA INTRAUTERINE DEVICE (IUD) MIGRATION, INITIAL ENCOUNTER: Primary | ICD-10-CM

## 2024-10-24 DIAGNOSIS — T83.32XD INTRAUTERINE CONTRACEPTIVE DEVICE THREADS LOST, SUBSEQUENT ENCOUNTER: Primary | ICD-10-CM

## 2024-10-24 DIAGNOSIS — Z01.812 PRE-PROCEDURAL LABORATORY EXAMINATION: ICD-10-CM

## 2024-10-24 PROCEDURE — 74018 RADEX ABDOMEN 1 VIEW: CPT | Performed by: STUDENT IN AN ORGANIZED HEALTH CARE EDUCATION/TRAINING PROGRAM

## 2024-10-24 PROCEDURE — 72170 X-RAY EXAM OF PELVIS: CPT | Performed by: STUDENT IN AN ORGANIZED HEALTH CARE EDUCATION/TRAINING PROGRAM

## 2024-10-24 RX ORDER — KETOROLAC TROMETHAMINE 30 MG/ML
30 INJECTION, SOLUTION INTRAMUSCULAR; INTRAVENOUS ONCE
Status: COMPLETED | OUTPATIENT
Start: 2024-10-24 | End: 2024-10-24

## 2024-10-24 RX ADMIN — KETOROLAC TROMETHAMINE 30 MG: 30 INJECTION, SOLUTION INTRAMUSCULAR; INTRAVENOUS at 15:33:00

## 2024-10-24 NOTE — PROGRESS NOTES
[facility]  Obstetrics and Gynecology  Focused Gynecology Problem Exam      Jeannie Horton is a 34 year old female presenting for Procedure (Endosee with iud removal and re-insertion)  .    HPI:     Chief Complaint   Patient presents with    Procedure     Endosee with iud removal and re-insertion     States last month had some heavy bleeding and large clots, unsure if saw IUD fall out.    Period Cycle (Days): 28 (10/7/2024 10:45 AM)  Period Duration (Days): 5 (10/7/2024 10:45 AM)  Period Flow: heavy (10/7/2024 10:45 AM)  Use of Birth Control (if yes, specify type): Mirena IUD (10/7/2024 10:45 AM)      Medications (Active prior to today's visit):  Current Outpatient Medications   Medication Sig Dispense Refill    HYDROcodone-acetaminophen 5-325 MG Oral Tab Take 1 tablet by mouth As Directed. Take 1 tablet 1 hour prior to procedure time 1 tablet 0    gabapentin 300 MG Oral Cap Take 1 capsule (300 mg total) by mouth every 8 (eight) hours as needed (For breakthrough moderate pain). 12 capsule 0    docusate sodium 100 MG Oral Cap Take 100 mg by mouth 2 (two) times daily as needed for constipation. 30 capsule 1    ibuprofen 600 MG Oral Tab Take 1 tablet (600 mg total) by mouth every 6 (six) hours as needed. 40 tablet 0    acetaminophen 500 MG Oral Tab Take 1 tablet (500 mg total) by mouth every 6 (six) hours as needed for Pain. 40 tablet 1    Prenatal 27-0.8 MG Oral Tab Take 1 tablet by mouth daily.      LORazepam (ATIVAN) 0.5 MG Oral Tab Take 1 tablet (0.5 mg total) by mouth As Directed. Take 1 tablet 1 hour prior to procedure time (Patient not taking: Reported on 10/24/2024) 1 tablet 0    nystatin 100,000 Units/g External Cream Apply 1 Application topically 2 (two) times daily. (Patient not taking: Reported on 10/24/2024) 30 g 0    triamcinolone 0.1 % External Ointment Apply 1 Application topically nightly. (Patient not taking: Reported on 10/24/2024) 15 g 0    Ferrous Sulfate 325 (65 Fe) MG Oral Tab Take 1 tablet (325 mg  total) by mouth every other day. (Patient not taking: Reported on 10/24/2024) 90 tablet 3     Allergies:  Allergies[1]  HISTORY:     OB History    Para Term  AB Living   1 1 1     1   SAB IAB Ectopic Multiple Live Births         0 1      # Outcome Date GA Lbr Dom/2nd Weight Sex Type Anes PTL Lv   1 Term 24 40w3d  9 lb 7.3 oz (4.29 kg) M Caesarean Spinal, EPI N CONOR      Complications: Group B beta strep +, Decreased FHR variability         Past Medical History:    Anemia       Past Surgical History:   Procedure Laterality Date    [place] teeth removed         History reviewed. No pertinent family history.    Social History     Socioeconomic History    Marital status: Single     Spouse name: Not on file    Number of children: Not on file    Years of education: Not on file    Highest education level: Not on file   Occupational History    Not on file   Tobacco Use    Smoking status: Never    Smokeless tobacco: Never   Substance and Sexual Activity    Alcohol use: Not Currently     Alcohol/week: 1.0 standard drink of alcohol     Types: 1 Standard drinks or equivalent per week     Comment: socially    Drug use: Never    Sexual activity: Not on file   Other Topics Concern    Not on file   Social History Narrative    Not on file     Social Drivers of Health     Financial Resource Strain: Unknown (2024)    Financial Resource Strain     Difficulty of Paying Living Expenses: Patient declined     Med Affordability: Yes   Food Insecurity: No Food Insecurity (2024)    Food Insecurity     Food Insecurity: Never true   Transportation Needs: No Transportation Needs (2024)    Transportation Needs     Lack of Transportation: No   Stress: No Stress Concern Present (2024)    Stress     Feeling of Stress : No   Housing Stability: Low Risk  (2024)    Housing Stability     Housing Instability: No     Housing Instability Emergency: Not on file     Crib or Opalttcait: Not on file       ROS:   Review of  Systems:    Constitutional:    denies fever / chills  Eyes:     denies blurred or double vision  Cardiovascular:  denies chest pain or palpitations  Respiratory:    denies shortness of breath  Gastrointestinal:  denies severe abdominal pain, frequent diarrhea or constipation, nausea / vomiting  Genitourinary:    denies dysuria, bothersome incontinence  Skin/Breast:   denies any breast pain, lumps, or discharge  Neurological:    denies frequent severe headaches  Psychiatric:   denies depression or anxiety, thoughts of harming self or others  Heme/Lymph:    denies easy bruising or bleeding  PHYSICAL EXAM:   /77   Pulse 85   LMP 10/16/2024 (Exact Date)     GENERAL: well developed, well nourished, in no apparent distress    Procedure in detail:  After consent obtained, a bivalve speculum was placed into the vagina and the cervix was cleaned with betadine. The anterior lip of the cervix grasped with a single toothed tenaculum. The uterus sounded to 8cm. The Endosee hysteroscope was advanced to the fundus, bilateral ostia visualized. No IUD was visualized after a detailed survey of the uterine cavity was performed. The Endosee hysteroscope was removed. All instruments removed from the uterus and vagina. Silver nitrate applied to tenaculum sits. Good hemostasis noted.     The patient tolerated the procedure well.        ASSESSMENT:    No IUD noted upon visualization with Endosee hysteroscope. Recommend abd/pelvic x-ray to r/o perforation. If all normal, may rtc for Mirena IUD insertion.  Pt understands to use condoms for bcm in the interim.       ICD-10-CM    1. Intrauterine contraceptive device threads lost, subsequent encounter  T83.32XD US PELVIS (TRANSABDOMINAL AND TRANSVAGINAL) (CPT=76856/25163)      2. Pre-procedural laboratory examination  Z01.812 POC Urine pregnancy test [92330]     ketorolac (Toradol) 30 MG/ML injection 30 mg          PLAN:   Will call with results of x-ray.     ORDERS:     Orders Placed  This Encounter   Procedures    POC Urine pregnancy test [58612]     PRESCRIPTIONS:     Requested Prescriptions      No prescriptions requested or ordered in this encounter     IMAGING/ REFERRALS:    US PELVIS (TRANSABDOMINAL AND TRANSVAGINAL) (CPT=76856/22431)     Gaby Negron MD  10/24/2024  3:18 PM               [1] No Known Allergies

## 2024-10-24 NOTE — TELEPHONE ENCOUNTER
Please schedule the following surgery:    Procedure: diagnostic laparoscopy, IUD removal, possible hysteroscopy, possible bilateral salpingectomy   Assist: (Y/N or none) Y  Date: after 11/14/24  Dx: perforated, retained IUD  Pre-op appt: (Y/N or n/a) n  Admission type: (IN/OUT/OBVS) out  Department of discharge(SDS/Floor): sds  Expected length of stay: 0  Procedure length time (please enter amount you are requesting): 60 min  Recovery time (patients always ask): 1wk  Medical Clearance: (Y/N) n  Special Requests: truclear hysteroscopy on standby    Surgical prophylaxis: none indicated       ALL Medicaid/including BCBS community: Tubal/ Hyst form MUST be signed (30 days):     Message to nurses: patient may come in on 10/25 to sign tubal consent form.       10/24/24 telephone encounter:  Called patient regarding x-ray findings. IUD is infact in the pelvis in a transverse position. Pt underwent Endosee hysteroscopy today and no IUD was seen in the uterine cavity. Discussed next steps for diagnostic laparoscopy and IUD removal, possible hysteroscopy. Discussed other birth control methods and pt considering Nexplanon or possible sterilization. Risks, benefits, and alternatives of permanent sterilization discussed with the patient including vasectomy for her partner, reversible contraceptive methods. The permanent nature of the procedure and risk of regret discussed with patient. The risk of of failure discussed, as well as the general risks of surgical and anesthesia complications. She understands if she decides to also pursue permanent sterilization would need to come complete consent and 30 day waiting period. In the mean time she will use condoms as backup method. R/b/a of surgery d/w patient . She understands and agrees with plan of care. Consented for dx lsc IUD removal and possible hysteroscopy.

## 2024-10-25 ENCOUNTER — TELEPHONE (OUTPATIENT)
Dept: OBGYN CLINIC | Facility: CLINIC | Age: 34
End: 2024-10-25

## 2024-10-25 NOTE — TELEPHONE ENCOUNTER
Spoke to patient discussed the need of coming in to office to sign sterilization consent form for possible bilateral salpingectomy. Patient states will come in to office today to sign form.       Patient has additional questions regarding upcoming surgery with Dr. Negron.       Nurses please reach out to patient regarding surgery.

## 2024-10-25 NOTE — TELEPHONE ENCOUNTER
Patient verified name and     Patient would like Dr. Negron to contact her to discuss the procedure process in depth. Patient has additional questions in regards to anesthesia as well. Informed message will be sent to Dr. Negron. Voiced understanding and agreed.

## 2024-10-25 NOTE — TELEPHONE ENCOUNTER
----- Message from Gaby Negron MD sent at 10/24/2024  5:39 PM CDT -----  I called the patient to relay the findings. Case request sent for lsc removal. Patient may consider sterilization at the time of surgery. She will need to sign tubal consent form. She may come in on 10/25. Please call to remind need for signature of tubal consent form. Thanks.

## 2024-10-29 ENCOUNTER — TELEPHONE (OUTPATIENT)
Dept: OBGYN CLINIC | Facility: CLINIC | Age: 34
End: 2024-10-29

## 2024-10-29 DIAGNOSIS — T83.39XA RETAINED INTRAUTERINE CONTRACEPTIVE DEVICE (IUD): Primary | ICD-10-CM

## 2024-10-29 NOTE — TELEPHONE ENCOUNTER
Please schedule the following surgery:     Procedure: diagnostic laparoscopy, IUD removal, diagnostic hysteroscopy  Assist: (Y/N or none) Y  Date: next available or after 11/14/24   Dx: perforated, retained IUD  Pre-op appt: (Y/N or n/a) n  Admission type: (IN/OUT/OBVS) out  Department of discharge(SDS/Floor): sds  Expected length of stay: 0  Procedure length time (please enter amount you are requesting): 60 min  Recovery time (patients always ask): 1wk  Medical Clearance: (Y/N) n  Special Requests: truclear hysteroscopy     Surgical prophylaxis: none indicated                    ALL Medicaid/including BCBS community: Tubal/ Hyst form MUST be signed (30 days):     Message to nurses: none

## 2024-10-31 PROBLEM — T83.39XA RETAINED INTRAUTERINE CONTRACEPTIVE DEVICE (IUD): Status: ACTIVE | Noted: 2024-10-31

## 2024-10-31 NOTE — TELEPHONE ENCOUNTER
I spoke with the patient, confirmed date and time for her procedure. I also sent a minor surgical case letter via [employer]    Surgical case request has been sent     Prior authorization is pending and clinicals have been uploaded. Reference number [id number]

## 2024-11-01 NOTE — TELEPHONE ENCOUNTER
Prior authorization has been approved, reference number [id number]    Kuldeep has agreed to the assist and the surgical case request has been sent

## 2024-11-13 NOTE — DISCHARGE INSTRUCTIONS
What happens after hysteroscopy?  You may have cramps and bleeding for 24 hours after the procedure. This is normal. Use pads instead of tampons.  Do not douche or use tampons until your health care provider says it’s OK.  Do not use any vaginal medicines until you are told it’s OK.  Ask your health care provider when it’s OK to have sex again.  When should I call my health care provider?  Call your health care provider if you have:  Heavy bleeding (more than 1 pad an hour for 2 or more hours)  A fever over 100.4°F (38.0°C)  Increasing abdominal pain or tenderness  Foul-smelling discharge          HOME INSTRUCTIONS  [facility] HOME CARE INSTRUCTIONS: POST-OP ANESTHESIA  The medication that you received for sedation or general anesthesia can last up to 24 hours. Your judgment and reflexes may be altered, even if you feel like your normal self.      We Recommend:   Do not drive any motor vehicle or bicycle   Avoid mowing the lawn, playing sports, or working with power tools/applicances (power saws, electric knives or mixers)   That you have someone stay with you on your first night home   Do not drink alcohol or take sleeping pills or tranquilizers   Do not sign legal documents within 24 hours of your procedure   If you had a nerve block for your surgery, take extra care not to put any pressure on your arm or hand for 24 hours    It is normal:  For you to have a sore throat if you had a breathing tube during surgery (while you were asleep!). The sore throat should get better within 48 hours. You can gargle with warm salt water (1/2 tsp in 4 oz warm water) or use a throat lozenge for comfort  To feel muscle aches or soreness especially in the abdomen, chest or neck. The achy feeling should go away in the next 24 hours  To feel weak, sleepy or \"wiped out\". Your should start feeling better in the next 24 hours.   To experience mild discomforts such as sore lip or tongue, headache, cramps, gas pains or a bloated feeling in  your abdomen.   To experience mild back pain or soreness for a day or two if you had spinal or epidural anesthesia.   If you had laparoscopic surgery, to feel shoulder pain or discomfort on the day of surgery.   For some patients to have nausea after surgery/anesthesia    If you feel nausea or experience vomiting:   Try to move around less.   Eat less than usual or drink only liquids until the next morning   Nausea should resolve in about 24 hours    If you have a problem when you are at home:    Call your surgeons office   Discharge Instructions: After Your Surgery  You’ve just had surgery. During surgery, you were given medicine called anesthesia to keep you relaxed and free of pain. After surgery, you may have some pain or nausea. This is common. Here are some tips for feeling better and getting well after surgery.   Going home  Your healthcare provider will show you how to take care of yourself when you go home. They'll also answer your questions. Have an adult family member or friend drive you home. For the first 24 hours after your surgery:   Don't drive or use heavy equipment.  Don't make important decisions or sign legal papers.  Take medicines as directed.  Don't drink alcohol.  Have someone stay with you, if needed. They can watch for problems and help keep you safe.  Be sure to go to all follow-up visits with your healthcare provider. And rest after your surgery for as long as your provider tells you to.   Coping with pain  If you have pain after surgery, pain medicine will help you feel better. Take it as directed, before pain becomes severe. Also, ask your healthcare provider or pharmacist about other ways to control pain. This might be with heat, ice, or relaxation. And follow any other instructions your surgeon or nurse gives you.      Stay on schedule with your medicine.     Tips for taking pain medicine  To get the best relief possible, remember these points:   Pain medicines can upset your stomach.  Taking them with a little food may help.  Most pain relievers taken by mouth need at least 20 to 30 minutes to start to work.  Don't wait till your pain becomes severe before you take your medicine. Try to time your medicine so that you can take it before starting an activity. This might be before you get dressed, go for a walk, or sit down for dinner.  Constipation is a common side effect of some pain medicines. Call your healthcare provider before taking any medicines such as laxatives or stool softeners to help ease constipation. Also ask if you should skip any foods. Drinking lots of fluids and eating foods such as fruits and vegetables that are high in fiber can also help. Remember, don't take laxatives unless your surgeon has prescribed them.  Drinking alcohol and taking pain medicine can cause dizziness and slow your breathing. It can even be deadly. Don't drink alcohol while taking pain medicine.  Pain medicine can make you react more slowly to things. Don't drive or run machinery while taking pain medicine.  Your healthcare provider may tell you to take acetaminophen to help ease your pain. Ask them how much you're supposed to take each day. Acetaminophen or other pain relievers may interact with your prescription medicines or other over-the-counter (OTC) medicines. Some prescription medicines have acetaminophen and other ingredients in them. Using both prescription and OTC acetaminophen for pain can cause you to accidentally overdose. Read the labels on your OTC medicines with care. This will help you to clearly know the list of ingredients, how much to take, and any warnings. It may also help you not take too much acetaminophen. If you have questions or don't understand the information, ask your pharmacist or healthcare provider to explain it to you before you take the OTC medicine.   Managing nausea  Some people have an upset stomach (nausea) after surgery. This is often because of anesthesia, pain, or  pain medicine, less movement of food in the stomach, or the stress of surgery. These tips will help you handle nausea and eat healthy foods as you get better. If you were on a special food plan before surgery, ask your healthcare provider if you should follow it while you get better. Check with your provider on how your eating should progress. It may depend on the surgery you had. These general tips may help:   Don't push yourself to eat. Your body will tell you when to eat and how much.  Start off with clear liquids and soup. They're easier to digest.  Next try semi-solid foods as you feel ready. These include mashed potatoes, applesauce, and gelatin.  Slowly move to solid foods. Don’t eat fatty, rich, or spicy foods at first.  Don't force yourself to have 3 large meals a day. Instead eat smaller amounts more often.  Take pain medicines with a small amount of solid food, such as crackers or toast. This helps prevent nausea.  When to call your healthcare provider  Call your healthcare provider right away if you have any of these:   You still have too much pain, or the pain gets worse, after taking the medicine. The medicine may not be strong enough. Or there may be a complication from the surgery.  You feel too sleepy, dizzy, or groggy. The medicine may be too strong.  Side effects such as nausea or vomiting. Your healthcare provider may advise taking other medicines to .  Skin changes such as rash, itching, or hives. This may mean you have an allergic reaction. Your provider may advise taking other medicines.  The incision looks different (for instance, part of it opens up).  Bleeding or fluid leaking from the incision site, and weren't told to expect that.  Fever of 100.4°F (38°C) or higher, or as directed by your provider.  Call 911  Call 911 right away if you have:   Trouble breathing  Facial swelling    If you have obstructive sleep apnea   You were given anesthesia medicine during surgery to keep you  comfortable and free of pain. After surgery, you may have more apnea spells because of this medicine and other medicines you were given. The spells may last longer than normal.    At home:  Keep using the continuous positive airway pressure (CPAP) device when you sleep. Unless your healthcare provider tells you not to, use it when you sleep, day or night. CPAP is a common device used to treat obstructive sleep apnea.  Talk with your provider before taking any pain medicine, muscle relaxants, or sedatives. Your provider will tell you about the possible dangers of taking these medicines.  Contact your provider if your sleeping changes a lot even when taking medicines as directed.  [employer] last reviewed this educational content on 10/1/2021  © 7160-1641 [employer]. All rights reserved. This information is not intended as a substitute for professional medical care. Always follow your healthcare professional's instructions.

## 2024-11-14 ENCOUNTER — HOSPITAL ENCOUNTER (OUTPATIENT)
Facility: HOSPITAL | Age: 34
Setting detail: HOSPITAL OUTPATIENT SURGERY
Discharge: HOME | End: 2024-11-14
Attending: STUDENT IN AN ORGANIZED HEALTH CARE EDUCATION/TRAINING PROGRAM | Admitting: STUDENT IN AN ORGANIZED HEALTH CARE EDUCATION/TRAINING PROGRAM
Payer: MEDICAID

## 2024-11-14 ENCOUNTER — ANESTHESIA (OUTPATIENT)
Dept: SURGERY | Facility: HOSPITAL | Age: 34
End: 2024-11-14
Payer: MEDICAID

## 2024-11-14 ENCOUNTER — ANESTHESIA EVENT (OUTPATIENT)
Dept: SURGERY | Facility: HOSPITAL | Age: 34
End: 2024-11-14
Payer: MEDICAID

## 2024-11-14 VITALS
HEIGHT: 61 IN | WEIGHT: 132 LBS | SYSTOLIC BLOOD PRESSURE: 98 MMHG | HEART RATE: 77 BPM | DIASTOLIC BLOOD PRESSURE: 62 MMHG | TEMPERATURE: 98 F | BODY MASS INDEX: 24.92 KG/M2 | OXYGEN SATURATION: 100 % | RESPIRATION RATE: 16 BRPM

## 2024-11-14 DIAGNOSIS — Z30.09 GENERAL COUNSELING AND ADVICE FOR CONTRACEPTIVE MANAGEMENT: Primary | ICD-10-CM

## 2024-11-14 PROBLEM — T83.39XA RETAINED INTRAUTERINE CONTRACEPTIVE DEVICE (IUD): Status: RESOLVED | Noted: 2024-10-31 | Resolved: 2024-11-14

## 2024-11-14 LAB — B-HCG UR QL: NEGATIVE

## 2024-11-14 PROCEDURE — 0UPD4HZ REMOVAL OF CONTRACEPTIVE DEVICE FROM UTERUS AND CERVIX, PERCUTANEOUS ENDOSCOPIC APPROACH: ICD-10-PCS | Performed by: STUDENT IN AN ORGANIZED HEALTH CARE EDUCATION/TRAINING PROGRAM

## 2024-11-14 PROCEDURE — 49329 UNLSTD LAPS PX ABD PERTM&OMN: CPT | Performed by: STUDENT IN AN ORGANIZED HEALTH CARE EDUCATION/TRAINING PROGRAM

## 2024-11-14 RX ORDER — HYDROMORPHONE HYDROCHLORIDE 1 MG/ML
0.4 INJECTION, SOLUTION INTRAMUSCULAR; INTRAVENOUS; SUBCUTANEOUS EVERY 5 MIN PRN
Status: DISCONTINUED | OUTPATIENT
Start: 2024-11-14 | End: 2024-11-14

## 2024-11-14 RX ORDER — GABAPENTIN 600 MG/1
300 TABLET ORAL EVERY 8 HOURS PRN
Qty: 12 TABLET | Refills: 0 | Status: SHIPPED | OUTPATIENT
Start: 2024-11-14

## 2024-11-14 RX ORDER — SODIUM CHLORIDE, SODIUM LACTATE, POTASSIUM CHLORIDE, CALCIUM CHLORIDE 600; 310; 30; 20 MG/100ML; MG/100ML; MG/100ML; MG/100ML
INJECTION, SOLUTION INTRAVENOUS CONTINUOUS
Status: DISCONTINUED | OUTPATIENT
Start: 2024-11-14 | End: 2024-11-14

## 2024-11-14 RX ORDER — HYDROMORPHONE HYDROCHLORIDE 1 MG/ML
0.6 INJECTION, SOLUTION INTRAMUSCULAR; INTRAVENOUS; SUBCUTANEOUS EVERY 5 MIN PRN
Status: DISCONTINUED | OUTPATIENT
Start: 2024-11-14 | End: 2024-11-14

## 2024-11-14 RX ORDER — NORELGESTROMIN AND ETHINYL ESTRADIOL 35; 150 UG/MG; UG/MG
1 PATCH TRANSDERMAL WEEKLY
Qty: 8 PATCH | Refills: 3 | Status: SHIPPED | OUTPATIENT
Start: 2024-11-14

## 2024-11-14 RX ORDER — LIDOCAINE HYDROCHLORIDE 10 MG/ML
INJECTION, SOLUTION EPIDURAL; INFILTRATION; INTRACAUDAL; PERINEURAL AS NEEDED
Status: DISCONTINUED | OUTPATIENT
Start: 2024-11-14 | End: 2024-11-14 | Stop reason: SURG

## 2024-11-14 RX ORDER — IBUPROFEN 600 MG/1
600 TABLET, FILM COATED ORAL EVERY 6 HOURS PRN
Qty: 120 TABLET | Refills: 0 | Status: SHIPPED | OUTPATIENT
Start: 2024-11-14

## 2024-11-14 RX ORDER — MORPHINE SULFATE 10 MG/ML
6 INJECTION, SOLUTION INTRAMUSCULAR; INTRAVENOUS EVERY 10 MIN PRN
Status: DISCONTINUED | OUTPATIENT
Start: 2024-11-14 | End: 2024-11-14

## 2024-11-14 RX ORDER — ACETAMINOPHEN 500 MG
1000 TABLET ORAL ONCE
Status: DISCONTINUED | OUTPATIENT
Start: 2024-11-14 | End: 2024-11-14 | Stop reason: HOSPADM

## 2024-11-14 RX ORDER — DEXAMETHASONE SODIUM PHOSPHATE 4 MG/ML
VIAL (ML) INJECTION AS NEEDED
Status: DISCONTINUED | OUTPATIENT
Start: 2024-11-14 | End: 2024-11-14 | Stop reason: SURG

## 2024-11-14 RX ORDER — ONDANSETRON 2 MG/ML
INJECTION INTRAMUSCULAR; INTRAVENOUS AS NEEDED
Status: DISCONTINUED | OUTPATIENT
Start: 2024-11-14 | End: 2024-11-14 | Stop reason: SURG

## 2024-11-14 RX ORDER — MORPHINE SULFATE 4 MG/ML
4 INJECTION, SOLUTION INTRAMUSCULAR; INTRAVENOUS EVERY 10 MIN PRN
Status: DISCONTINUED | OUTPATIENT
Start: 2024-11-14 | End: 2024-11-14

## 2024-11-14 RX ORDER — GLYCOPYRROLATE 0.2 MG/ML
INJECTION, SOLUTION INTRAMUSCULAR; INTRAVENOUS AS NEEDED
Status: DISCONTINUED | OUTPATIENT
Start: 2024-11-14 | End: 2024-11-14 | Stop reason: SURG

## 2024-11-14 RX ORDER — PHENYLEPHRINE HCL 10 MG/ML
VIAL (ML) INJECTION AS NEEDED
Status: DISCONTINUED | OUTPATIENT
Start: 2024-11-14 | End: 2024-11-14 | Stop reason: SURG

## 2024-11-14 RX ORDER — ACETAMINOPHEN 500 MG
1000 TABLET ORAL EVERY 6 HOURS PRN
Qty: 120 TABLET | Refills: 0 | Status: SHIPPED | OUTPATIENT
Start: 2024-11-14

## 2024-11-14 RX ORDER — SENNA AND DOCUSATE SODIUM 50; 8.6 MG/1; MG/1
1 TABLET, FILM COATED ORAL DAILY
Qty: 90 TABLET | Refills: 0 | Status: SHIPPED | OUTPATIENT
Start: 2024-11-14

## 2024-11-14 RX ORDER — HYDROMORPHONE HYDROCHLORIDE 1 MG/ML
0.2 INJECTION, SOLUTION INTRAMUSCULAR; INTRAVENOUS; SUBCUTANEOUS EVERY 5 MIN PRN
Status: DISCONTINUED | OUTPATIENT
Start: 2024-11-14 | End: 2024-11-14

## 2024-11-14 RX ORDER — LIDOCAINE HYDROCHLORIDE 10 MG/ML
INJECTION, SOLUTION EPIDURAL; INFILTRATION; INTRACAUDAL; PERINEURAL AS NEEDED
Status: DISCONTINUED | OUTPATIENT
Start: 2024-11-14 | End: 2024-11-14 | Stop reason: HOSPADM

## 2024-11-14 RX ORDER — MORPHINE SULFATE 4 MG/ML
2 INJECTION, SOLUTION INTRAMUSCULAR; INTRAVENOUS EVERY 10 MIN PRN
Status: DISCONTINUED | OUTPATIENT
Start: 2024-11-14 | End: 2024-11-14

## 2024-11-14 RX ORDER — NALOXONE HYDROCHLORIDE 0.4 MG/ML
0.08 INJECTION, SOLUTION INTRAMUSCULAR; INTRAVENOUS; SUBCUTANEOUS AS NEEDED
Status: DISCONTINUED | OUTPATIENT
Start: 2024-11-14 | End: 2024-11-14

## 2024-11-14 RX ORDER — ROCURONIUM BROMIDE 10 MG/ML
INJECTION, SOLUTION INTRAVENOUS AS NEEDED
Status: DISCONTINUED | OUTPATIENT
Start: 2024-11-14 | End: 2024-11-14 | Stop reason: SURG

## 2024-11-14 RX ADMIN — PHENYLEPHRINE HCL 50 MCG: 10 MG/ML VIAL (ML) INJECTION at 08:01:00

## 2024-11-14 RX ADMIN — GLYCOPYRROLATE 0.1 MG: 0.2 INJECTION, SOLUTION INTRAMUSCULAR; INTRAVENOUS at 07:48:00

## 2024-11-14 RX ADMIN — LIDOCAINE HYDROCHLORIDE 50 MG: 10 INJECTION, SOLUTION EPIDURAL; INFILTRATION; INTRACAUDAL; PERINEURAL at 07:49:00

## 2024-11-14 RX ADMIN — ROCURONIUM BROMIDE 40 MG: 10 INJECTION, SOLUTION INTRAVENOUS at 07:49:00

## 2024-11-14 RX ADMIN — DEXAMETHASONE SODIUM PHOSPHATE 8 MG: 4 MG/ML VIAL (ML) INJECTION at 07:48:00

## 2024-11-14 RX ADMIN — ONDANSETRON 4 MG: 2 INJECTION INTRAMUSCULAR; INTRAVENOUS at 07:48:00

## 2024-11-14 NOTE — ANESTHESIA PROCEDURE NOTES
Airway  Date/Time: 11/14/2024 7:51 AM  Urgency: Elective    Airway not difficult    General Information and Staff    Patient location during procedure: OR  Resident/CRNA: Celsa Gomez CRNA  Performed: CRNA   Performed by: Celsa Gomez CRNA  Authorized by: Ton Gavin MD      Indications and Patient Condition  Indications for airway management: anesthesia  Sedation level: deep  Preoxygenated: yes  Patient position: sniffing  Mask difficulty assessment: 1 - vent by mask    Final Airway Details  Final airway type: endotracheal airway      Successful airway: ETT  Cuffed: yes   Successful intubation technique: direct laryngoscopy  Endotracheal tube insertion site: oral  Blade: Nielsen  Blade size: #2  ETT size (mm): 7.0    Cormack-Lehane Classification: grade I - full view of glottis  Placement verified by: capnometry   Measured from: teeth  ETT to teeth (cm): 20  Number of attempts at approach: 1  Number of other approaches attempted: 0    Additional Comments  Dentition and oral mucosa per preop assessment, post intubation. Head/neck remained in neutral position.

## 2024-11-14 NOTE — OPERATIVE REPORT
[facility]  part of [facility]    Operative Note         Jeannie Horton Location: OR   [facility] [id number] MRN [id number]   Admission Date 11/14/2024 Operation Date 11/14/2024   Attending Physician Gaby Negron MD       Patient Name: Jeannie Horton     Preoperative Diagnosis: Retained intrauterine contraceptive device (IUD) [T83.39XA]     Postoperative Diagnosis: Retained intrauterine contraceptive device (IUD) [T83.39XA]     Procedure(s):  Diagnostic laparoscopy, intrauterine device removal, diagnostic hysteroscopy  Intrauterine device insertion/removal  Truclear hysteroscopy, possible excision fibroids/polyp     Primary Surgeon: Gaby Negron MD, MD     Surgical Assistant.: Kuldeep Cummings     Anesthesia: General     Specimen: * No specimens in log *     Estimated Blood Loss: 5ml    Complications: none      Indications for procedure: retained IUD     Surgical Findings:   Hysteroscopy: b/l tubal ostia visualized, no IUD was seen intrauterine  Laparoscopy: normal tubes and ovaries bilaterally: Normal uterus. Mirena IUD noted to be in posterior cul de sac; normal appearing liver edge, appendix not visualized    Complexity: n/a    Operative Summary:    Procedure in detail:  Pt was taken to the operating room where general anesthesia was obtained without difficulty. She was prepared and draped in the normal sterile fashion in the dorsal lithotomy position in the [facility]. Her bladder was straight catheterized. A bivalve speculum was placed into the vagina and the anterior lip of the cervix grasped with a single toothed tenaculum. The cervix was easily dilated with Hegar dilators to 6 mm. The diagnostic hysteroscope was advanced and the above findings were noted. Bilateral ostia visualized.  A Humi uterine manipulator was then placed.    The hysteroscope was removed and proceeded to begin with diagnostic laparoscopy. The Veress needle was placed at LUQ and insufflated  to 15 mmHg. The camera and 5 mm port/trochar were introduced atraumatically. Another 5mm port was placed at the RLQ under direct visualization. The uterus was lifted upward and the Mirena IUD was noted to be in the posterior cul de sac. This was retrieved and removed intact out of the body.     All instruments were removed from the abdomen and vagina. The skin incisions were closed with 4-0 Monocryl and skin glue. The patient tolerated the procedure well. All counts were correct.     Kuldeep Jacobsuncion was utilized as a surgical assist for the entire procedure due to the need for tissue retraction, dissection of vital structures, prevention and management of blood loss, and reduction in overall operative and anesthesia time. They were also involved in the critical decision making as it relates to the complexity of this case.          Implants: * No implants in log *     Drains: none     Condition: stable, pacu then home       Gaby Negron MD

## 2024-11-14 NOTE — H&P
[facility]  part of [facility]        HISTORY AND PHYSICAL        Subjective   Chief Complaint:  retained IUD      History of Present Illness:    Jeannie Horton is a  34 year old y/o  who presents for scheduled gyn procedure  dx hysteroscopy/laparoscopy for IUD removal.            Past Medical History:    Anemia    Hx of motion sickness    PONV (postoperative nausea and vomiting)    Visual impairment       Past Surgical History:   Procedure Laterality Date    Hc  section level i      [place] teeth removed         OB History    Para Term  AB Living   1 1 1 0 0 1   SAB IAB Ectopic Multiple Live Births   0 0 0 0 1       Allergies[1]    No current outpatient medications on file.      History reviewed. No pertinent family history.      REVIEW OF SYSTEMS:   CONSTITUTIONAL: Negative for fever, chills, diaphoresis, weakness, fatigue, weight loss, weight gain.  ALLERGIES: Negative for urticaria, hay fever, angioedema  EYES: Negative for blurry vision, decreased vision, loss of vision, eye pain, diplopia, photophobia, discharge  ENT: Negative for sore throat, nasal congestion, nasal discharge, epistaxis, tinnitus, hearing loss  CARDIOVASCULAR: Negative for chest pain, dyspnea on exertion, orthopnea, paroxysmal nocturnal dyspnea, edema, palpitations  RESPIRATORY: Negative for cough, hemoptysis, shortness of breath, pleuritic chest pain, wheezing  BREAST:  Denies breast mass, breast pain, nipple discharge or nipple pain.  ENDOCRINE: Negative for polydipsia/polyuria, palpitations, skin changes, temperature intolerance, unexpected weight changes  HEME-LYMPH: Negative for swollen lymph nodes, bleeding, bruising  GI: Negative abdominal pain, flank pain, nausea, vomiting, diarrhea, constipation, black stool, blood in stool  : Negative for dysuria, frequency/urgency, hematuria, genital discharge, vaginal bleeding, irregular menses, heavy menses, pelvic pain  NEURO: Negative  for dizzy/vertigo, headache, focal weakness, numbness/tingling, speech problems, loss of consciousness, confusion, memory loss  MUSCULOSKELETAL: Negative for back pain, joint pain, joint stiffness, joint swelling, muscle pain, muscle weakness  SKIN: Negative for rash, itching, hives  PSYCH: Negative for anxiety, depression, physical abuse, sexual abuse      PHYSICAL EXAM:    /59 (BP Location: Right arm)   Pulse 101   Temp 97.8 °F (36.6 °C) (Oral)   Resp 18   Ht 5' 1\" (1.549 m)   Wt 132 lb (59.9 kg)   LMP 10/16/2024 (Exact Date)   SpO2 98%   Breastfeeding Yes   BMI 24.94 kg/m²        General   Mental Status - Alert. General Appearance - Cooperative. Orientation - Oriented X4. Build & Nutrition - Well nourished.    Head and Neck  Thyroid   Gland Characteristics - normal size and consistency.    Chest and Lung Exam   Inspection:   Chest Wall: - Normal.  Percussion:   Quality and Intensity: - Percussion normal.  Palpation: - Palpation normal.  Auscultation:   Breath sounds: - Normal.  Adventitious sounds: - No Adventitious sounds.      Cardiovascular   Auscultation: Rhythm - Regular. Heart Sounds - Normal heart sounds.  Murmurs & Other Heart Sounds: Auscultation of the heart reveals - No Murmurs.      Abdomen   Inspection: Inspection of the abdomen reveals - No Hernias. Incisional scars - No incisional scars.  Palpation/Percussion: Palpation and Percussion of the abdomen reveal - Non Tender and No Palpable abdominal masses.  Liver: - Normal.  Auscultation: Auscultation of the abdomen reveals - Bowel sounds normal.      Female Genitourinary     External Genitalia   Perineum - Normal. Bartholin's Gland - Bilateral - Normal. Clitoris - Normal.  Introitus: Characteristics - No Cystocele, Enterocele or Rectocele. Discharge - None.  Labia Majora: Lesions - Bilateral - None. Characteristics - Bilateral - Normal.  Labia Minora: Lesions - Bilateral - None. Characteristics - Bilateral - Normal.  Urethra:  Characteristics - Normal. Discharge - None.  [place] Gland - Bilateral - Normal.  Vulva: Characteristics - Normal. Lesions - None.    Speculum & Bimanual   Vagina:   Vaginal Wall: - Normal.  Vaginal Lesions - None. Vaginal Mucosa - Normal.  Cervix: Characteristics - No Motion tenderness. Discharge - None.  Uterus: Characteristics - Normal. Position - Midposition.  Adnexa: Characteristics - Bilateral - Normal. Masses - No Adnexal Masses.      Peripheral Vascular   Upper Extremity:   Palpation: - Pulses bilaterally normal.  Lower Extremity: Inspection - Bilateral - Inspection Normal.  Palpation: Edema - Bilateral - No edema.      Neurologic   Mental Status: - Normal.      Lymphatic  General Lymphatics   Description - Normal .      Lab Results   Component Value Date    WBC 8.1 02/08/2024    HGB 9.6 (L) 02/08/2024    HCT 28.4 (L) 02/08/2024    .0 02/08/2024    MCV 87.4 02/08/2024    RDW 18.8 (H) 02/08/2024     No components found for: \"ABOGROUP\", \"RHTYPE\", \"RUBIGG\"            Assessment and Plan:      Active Problems:    Retained intrauterine contraceptive device (IUD)        The patient was counseled regarding surgery and the procedure (diagnostic hysteroscopy, diagnostic laparoscopy for removal of IUD) was reviewed at length.   Risks of procedure including bleeding/need for blood transfusion (<1%), infection (5-10%), damage to other organs/bowel/bladder/ureters (<1%),  and anesthesia were reviewed.  Benefits, alternatives, & indications were also discussed.  All questions were answered.  Written information was provided.      Gaby Negron MD         [1] No Known Allergies

## 2024-11-14 NOTE — ANESTHESIA POSTPROCEDURE EVALUATION
Patient: Jeannie Horton    Procedure Summary       Date: 11/14/24 Room / Location: [facility] MAIN OR  / [facility] MAIN OR    Anesthesia Start: 0745 Anesthesia Stop: 0848    Procedures:       Diagnostic laparoscopy, intrauterine device removal, diagnostic hysteroscopy (Abdomen)      Intrauterine device insertion/removal (Vagina )      Truclear hysteroscopy, possible excision fibroids/polyp (Vagina ) Diagnosis:       Retained intrauterine contraceptive device (IUD)      (Retained intrauterine contraceptive device (IUD) [T83.39XA])    Surgeons: Gaby Negron MD Anesthesiologist: Ton Gavin MD    Anesthesia Type: general ASA Status: 2            Anesthesia Type: general    Vitals Value Taken Time   /66 11/14/24 0844   Temp 97.5 11/14/24 0848   Pulse 99 11/14/24 0847   Resp 19 11/14/24 0847   SpO2 99 % 11/14/24 0847   Vitals shown include unfiled device data.    [facility] AN Post Evaluation:   Patient Evaluated in PACU  Patient Participation: complete - patient participated  Level of Consciousness: awake and alert  Pain Score: 0  Pain Management: adequate  Airway Patency:patent  Yes    Nausea/Vomiting: none  Cardiovascular Status: acceptable  Respiratory Status: acceptable  Postoperative Hydration acceptable      Celsa Gomez CRNA  11/14/2024 8:48 AM

## 2024-11-14 NOTE — ANESTHESIA PREPROCEDURE EVALUATION
Anesthesia PreOp Note    HPI:     Jeannie Horton is a 34 year old female who presents for preoperative consultation requested by: Gaby Negron MD    Date of Surgery: 2024    Procedure(s):  Diagnostic laparoscopy, intrauterine device removal, diagnostic hysteroscopy  Intrauterine device insertion/removal  Truclear hysteroscopy, possible excision fibroids/polyp  Indication: Retained intrauterine contraceptive device (IUD) [T83.39XA]    Relevant Problems   No relevant active problems       NPO:                         History Review:  Patient Active Problem List    Diagnosis Date Noted    Retained intrauterine contraceptive device (IUD) 10/31/2024    Postpartum care and examination of lactating mother (HCC) 2024    Pregnancy (HCC) 2024    Post-dates pregnancy (HCC) 2024       Past Medical History:    Anemia    Hx of motion sickness    PONV (postoperative nausea and vomiting)    Visual impairment       Past Surgical History:   Procedure Laterality Date    Hc  section level i      Ragan teeth removed         Prescriptions Prior to Admission[1]  Current Medications and Prescriptions Ordered in Epic[2]    Allergies[3]    History reviewed. No pertinent family history.  Social History     Socioeconomic History    Marital status:    Tobacco Use    Smoking status: Never    Smokeless tobacco: Never   Substance and Sexual Activity    Alcohol use: Not Currently     Alcohol/week: 1.0 standard drink of alcohol     Types: 1 Standard drinks or equivalent per week     Comment: socially    Drug use: Never       Available pre-op labs reviewed.             Vital Signs:  Body mass index is 25.89 kg/m².   height is 1.549 m (5' 1\") and weight is 62.1 kg (137 lb).   Vitals:    24 1327   Weight: 62.1 kg (137 lb)   Height: 1.549 m (5' 1\")        Anesthesia Evaluation     Patient summary reviewed and Nursing notes reviewed    History of anesthetic complications   Airway   Mallampati:  II  TM distance: >3 FB  Dental - Dentition appears grossly intact     Pulmonary - negative ROS and normal exam   Cardiovascular - negative ROS and normal exam    Neuro/Psych - negative ROS     GI/Hepatic/Renal - negative ROS     Endo/Other      Comments: PP, breastfeeding  Abdominal  - normal exam                 Anesthesia Plan:   ASA:  2  Plan:   General  Airway:  ETT  Post-op Pain Management: IV analgesics  Informed Consent Plan and Risks Discussed With:  Patient      I have informed Jeannie Horton and/or legal guardian or family member of the nature of the anesthetic plan, benefits, risks including possible dental damage if relevant, major complications, and any alternative forms of anesthetic management.   All of the patient's questions were answered to the best of my ability. The patient desires the anesthetic management as planned.  Ton Gavin MD  11/14/2024 6:45 AM  Present on Admission:  **None**           [1]   Facility-Administered Medications Prior to Admission   Medication Dose Route Frequency Provider Last Rate Last Admin    [COMPLETED] ketorolac (Toradol) 30 MG/ML injection 30 mg  30 mg Intramuscular Once Gaby Negron MD   30 mg at 10/24/24 1533     Medications Prior to Admission   Medication Sig Dispense Refill Last Dose/Taking    docusate sodium 100 MG Oral Cap Take 100 mg by mouth 2 (two) times daily as needed for constipation. 30 capsule 1 11/14/2024 at  5:50 AM    acetaminophen 500 MG Oral Tab Take 1 tablet (500 mg total) by mouth every 6 (six) hours as needed for Pain. 40 tablet 1 11/14/2024 at  5:00 AM    Prenatal 27-0.8 MG Oral Tab Take 1 tablet by mouth daily. PT INSTRUCTED TO STOP PRENATAL VITAMIN FROM TODAY UNTIL AFTER SURGERY, PT AGREED   11/14/2024 at  5:50 AM    HYDROcodone-acetaminophen 5-325 MG Oral Tab Take 1 tablet by mouth As Directed. Take 1 tablet 1 hour prior to procedure time 1 tablet 0     LORazepam (ATIVAN) 0.5 MG Oral Tab Take 1 tablet (0.5 mg total)  by mouth As Directed. Take 1 tablet 1 hour prior to procedure time (Patient not taking: Reported on 10/24/2024) 1 tablet 0     nystatin 100,000 Units/g External Cream Apply 1 Application topically 2 (two) times daily. (Patient not taking: Reported on 10/24/2024) 30 g 0     triamcinolone 0.1 % External Ointment Apply 1 Application topically nightly. (Patient not taking: Reported on 10/24/2024) 15 g 0     gabapentin 300 MG Oral Cap Take 1 capsule (300 mg total) by mouth every 8 (eight) hours as needed (For breakthrough moderate pain). 12 capsule 0     ibuprofen 600 MG Oral Tab Take 1 tablet (600 mg total) by mouth every 6 (six) hours as needed. 40 tablet 0     Ferrous Sulfate 325 (65 Fe) MG Oral Tab Take 1 tablet (325 mg total) by mouth every other day. (Patient not taking: Reported on 10/24/2024) 90 tablet 3    [2]   Current Facility-Administered Medications Ordered in Epic   Medication Dose Route Frequency Provider Last Rate Last Admin    lactated ringers infusion   Intravenous Continuous Gaby Negron MD        acetaminophen (Tylenol Extra Strength) tab 1,000 mg  1,000 mg Oral Once Gaby Negron MD         No current [facility]-ordered outpatient medications on file.   [3] No Known Allergies

## (undated) DEVICE — Device

## (undated) DEVICE — GLOVE SUR 6 SENSICARE PI PIP GRN PWD F

## (undated) DEVICE — GLOVE SUR 6 SENSICARE PI MIC PIP CRM PWD F

## (undated) DEVICE — TROCAR: Brand: KII FIOS FIRST ENTRY

## (undated) DEVICE — DISPOSABLE GRASPER CARTRIDGE: Brand: DIRECT DRIVE REPOSABLE GRASPERS

## (undated) DEVICE — NEEDLE SPNL 22GA L3.5IN BLK QNCKE STYL DISP

## (undated) DEVICE — POWDER HEMSTAT 3GM OXIDIZED REGENERATED CELOS

## (undated) DEVICE — [HIGH FLOW INSUFFLATOR,  DO NOT USE IF PACKAGE IS DAMAGED,  KEEP DRY,  KEEP AWAY FROM SUNLIGHT,  PROTECT FROM HEAT AND RADIOACTIVE SOURCES.]: Brand: PNEUMOSURE

## (undated) DEVICE — KIT HYSTEROSCOPIC PROC INCL INFLO OUTFLO TB

## (undated) DEVICE — ADHESIVE SKIN TOP FOR WND CLSR DERMBND ADV

## (undated) DEVICE — CANISTER SUCT 3000CC HI FLO DISP FOR FLD MGMT

## (undated) DEVICE — DRAPE, LAPAROSCOPY PELVISCOPY: Brand: MEDLINE

## (undated) DEVICE — TRAY CATH FOLEY 16FR INCLUDE LUBRI SIL IC COMPLT

## (undated) DEVICE — STRAP OR POS W3.5XL19IN FOAM STRRP W/ SLIP

## (undated) DEVICE — ELITE HYSTEROSCOPE SEAL: Brand: TRUCLEAR

## (undated) DEVICE — SUT MCRYL 4-0 27IN ABSRB UD 19MM PS-2 3/8

## (undated) DEVICE — NEEDLE ANES 22GA L3.5IN SPNL SS QNCKE STYL

## (undated) DEVICE — LAPAROSCOPY: Brand: MEDLINE INDUSTRIES, INC.

## (undated) DEVICE — 12 ML SYRINGE LUER-LOCK TIP: Brand: MONOJECT

## (undated) DEVICE — SYRINGE 10CC LL TIP CONV PAK

## (undated) DEVICE — INJECTOR MANIP L13CM DIA5MM BLLN TIP KRONNER

## (undated) DEVICE — SOLUTION IRRIG 3000ML 0.9% NACL FLX CONT

## (undated) DEVICE — SOLUTION IRRIG 1000ML 0.9% NACL USP BTL

## (undated) DEVICE — TROCAR: Brand: KII® SLEEVE

## (undated) DEVICE — INSUFFLATION NEEDLE TO ESTABLISH PNEUMOPERITONEUM.: Brand: INSUFFLATION NEEDLE

## (undated) DEVICE — 6 ML SYRINGE LUER-LOCK TIP: Brand: MONOJECT

## (undated) NOTE — LETTER
AUTHORIZATION FOR SURGICAL OPERATION OR OTHER PROCEDURE    1. I hereby authorize Dr. Gaby Negron, and [facility] staff assigned to my case to perform the following operation and/or procedure at the [facility] site:    ENDOSEE WITH IUD REMOVAL AND RE-INSERTION    2.  My physician has explained the nature and purpose of the operation or other procedure, possible alternative methods of treatment, the risks involved, and the possibility of complication to me.  I acknowledge that no guarantee has been made as to the result that may be obtained.  3.  I recognize that, during the course of this operation, or other procedure, unforseen conditions may necessitate additional or different procedure than those listed above.  I, therefore, further authorize and request that the above named physician, his/her physician assistants or designees perform such procedures as are, in his/her professional opinion, necessary and desirable.  4.  Any tissue or organs removed in the operation or other procedure may be disposed of by and at the discretion of the [facility] and [facility].  5.  I understand that in the event of a medical emergency, I will be transported by local paramedics to [facility] or other hospital emergency department.  6.  I certify that I have read and fully understand the above consent to operation and/or other procedure.    7.  I acknowledge that my physician has explained sedation/analgesia administration to me including the risks and benefits.  I consent to the administration of sedation/analgesia as may be necessary or desirable in the judgement of my physician.    Witness signature: ___________________________________________________ Date:  ______/______/_____                    Time:  ________ A.M.  P.M.       Patient Name:  ______________________________________________________  (please print)      Patient signature:   ___________________________________________________             Relationship to Patient:           []  Parent    Responsible person                          []  Spouse  In case of minor or                    [] Other  _____________   Incompetent name:  __________________________________________________                               (please print)      _____________      Responsible person  In case of minor or  Incompetent signature:  _______________________________________________    Statement of Physician  My signature below affirms that prior to the time of the procedure, I have explained to the patient and/or his/her guardian, the risks and benefits involved in the proposed treatment and any reasonable alternative to the proposed treatment.  I have also explained the risks and benefits involved in the refusal of the proposed treatment and have answered the patient's questions.                        Date:  ______/______/_______  Provider                      Signature:  __________________________________________________________       Time:  ___________ A.M    P.M.

## (undated) NOTE — LETTER
[facility]  Administration of Anesthesia  KISHAJeannie agree to be cared for by a physician anesthesiologist alone and/or with a nurse anesthetist, who is specially trained to monitor me and give me medicine to put me to sleep or keep me comfortable during my procedure    I understand that my anesthesiologist and/or anesthetist is not an employee or agent of [facility] or [employer]. He or she works for [facility]    As the patient asking for anesthesia services, I agree to:  Allow the anesthesiologist (anesthesia doctor) to give me medicine and do additional procedures as necessary. Some examples are: Starting or using an “IV” to give me medicine, fluids or blood during my procedure, and having a breathing tube placed to help me breathe when I’m asleep (intubation). In the event that my heart stops working properly, I understand that my anesthesiologist will make every effort to sustain my life, unless otherwise directed by [facility] Do Not Resuscitate documents.  Tell my anesthesia doctor before my procedure:  If I am pregnant.  The last time that I ate or drank.  iii. All of the medicines I take (including prescriptions, herbal supplements, and pills I can buy without a prescription (including street drugs/illegal medications). Failure to inform my anesthesiologist about these medicines may increase my risk of anesthetic complications.  iv.If I am allergic to anything or have had a reaction to anesthesia before.  I understand how the anesthesia medicine will help me (benefits).  I understand that with any type of anesthesia medicine there are risks:  The most common risks are: nausea, vomiting, sore throat, muscle soreness, damage to my eyes, mouth, or teeth (from breathing tube placement).  Rare risks include: remembering what happened during my procedure, allergic reactions to medications, injury to my airway, heart, lungs, vision, nerves, or  muscles and in extremely rare instances death.  My doctor has explained to me other choices available to me for my care (alternatives).  Pregnant Patients (“epidural”):  I understand that the risks of having an epidural (medicine given into my back to help control pain during labor), include itching, low blood pressure, difficulty urinating, headache or slowing of the baby’s heart. Very rare risks include infection, bleeding, seizure, irregular heart rhythms and nerve injury.  Regional Anesthesia (“spinal”, “epidural”, & “nerve blocks”):  I understand that rare but potential complications include headache, bleeding, infection, seizure, irregular heart rhythms, and nerve injury.    _____________________________________________________________________________  Patient (or Representative) Signature/Relationship to Patient  Date   Time    _____________________________________________________________________________   Name (if used)    Language/Organization   Time    _____________________________________________________________________________  Nurse Anesthetist Signature     Date   Time  _____________________________________________________________________________  Anesthesiologist Signature     Date   Time  I have discussed the procedure and information above with the patient (or patient’s representative) and answered their questions. The patient or their representative has agreed to have anesthesia services.    _____________________________________________________________________________  Witness        Date   Time  I have verified that the signature is that of the patient or patient’s representative, and that it was signed before the procedure  Patient Name: Jeannie Horton     : 1990                 Printed: 2024 at 7:54 PM    Medical Record #: [id number]                                            Page 1 of 1  ----------ANESTHESIA CONSENT----------

## (undated) NOTE — LETTER
AUTHORIZATION FOR SURGICAL OPERATION OR OTHER PROCEDURE    1. I hereby authorize MYKE Delgado, and [facility] staff assigned to my case to perform the following operation and/or procedure at the [facility] site:    IUD Insertion    2.  My physician has explained the nature and purpose of the operation or other procedure, possible alternative methods of treatment, the risks involved, and the possibility of complication to me.  I acknowledge that no guarantee has been made as to the result that may be obtained.  3.  I recognize that, during the course of this operation, or other procedure, unforseen conditions may necessitate additional or different procedure than those listed above.  I, therefore, further authorize and request that the above named physician, his/her physician assistants or designees perform such procedures as are, in his/her professional opinion, necessary and desirable.  4.  Any tissue or organs removed in the operation or other procedure may be disposed of by and at the discretion of the [facility] and [facility].  5.  I understand that in the event of a medical emergency, I will be transported by local paramedics to [facility] or other hospital emergency department.  6.  I certify that I have read and fully understand the above consent to operation and/or other procedure.    7.  I acknowledge that my physician has explained sedation/analgesia administration to me including the risks and benefits.  I consent to the administration of sedation/analgesia as may be necessary or desirable in the judgement of my physician.    Witness signature: ___________________________________________________ Date: 06/14/2024                    Time:  ________ A.M.  P.M.       Patient Name:  ______________________________________________________  (please print)      Patient signature:   ___________________________________________________             Relationship to Patient:           []  Parent    Responsible person                          []  Spouse  In case of minor or                    [] Other  _____________   Incompetent name:  __________________________________________________                               (please print)      _____________      Responsible person  In case of minor or  Incompetent signature:  _______________________________________________    Statement of Physician  My signature below affirms that prior to the time of the procedure, I have explained to the patient and/or his/her guardian, the risks and benefits involved in the proposed treatment and any reasonable alternative to the proposed treatment.  I have also explained the risks and benefits involved in the refusal of the proposed treatment and have answered the patient's questions.                        Date: 06/14/2024  Provider                      Signature:  __________________________________________________________       Time:  ___________ A.M    P.M.

## (undated) NOTE — LETTER
MINOR CASE LETTER      10/31/2024        Dear Jeannie,    Your are having a laparoscopic IUD removal on Thursday, November 14th at 7:30AM. Please arrive to the hospital 2 hours early.     Do not eat or drink anything (including water) after midnight the night before surgery.    If your procedure is scheduled later in the day, then nothing by mouth for 6 hours before arrival to the hospital.    You are to call this office if you have any cold or flu symptoms 2 days before your scheduled surgery.    Please avoid aspirin 7 days before surgery.  Avoid Ibuprofen, Motrin, Aleve, or Naprosyn for 3 days before surgery.    You will be contacted by PreAdmission Testing (PAT) usually within the week before surgery.  They will take a short medical history and let you know if any preoperative testing is needed.    Please make arrangements for someone to drive you home after your surgery.    Call our office now to schedule your post-operative appointment for 2 weeks after surgery.    Please feel free to contact our office at [phone] if you have any questions regarding these instructions or your procedure.      Sincerely,      Gaby Negron MD, MD  [facility] - OB/GYN  [address]  [place] [address] [phone]

## (undated) NOTE — LETTER
AUTHORIZATION FOR SURGICAL OPERATION OR OTHER PROCEDURE    1. I hereby authorize Dr. Gaby Negron MD and [facility] staff assigned to my case to perform the following operation and/or procedure at the [facility] site:    _______________________________________________________________________________________________    ENDOSEE IUD REMOVAL /  MIRENA RE-INSERTION  _______________________________________________________________________________________________    2.  My physician has explained the nature and purpose of the operation or other procedure, possible alternative methods of treatment, the risks involved, and the possibility of complication to me.  I acknowledge that no guarantee has been made as to the result that may be obtained.  3.  I recognize that, during the course of this operation, or other procedure, unforseen conditions may necessitate additional or different procedure than those listed above.  I, therefore, further authorize and request that the above named physician, his/her physician assistants or designees perform such procedures as are, in his/her professional opinion, necessary and desirable.  4.  Any tissue or organs removed in the operation or other procedure may be disposed of by and at the discretion of the [facility] and [facility].  5.  I understand that in the event of a medical emergency, I will be transported by local paramedics to [facility] or other hospital emergency department.  6.  I certify that I have read and fully understand the above consent to operation and/or other procedure.    7.  I acknowledge that my physician has explained sedation/analgesia administration to me including the risks and benefits.  I consent to the administration of sedation/analgesia as may be necessary or desirable in the judgement of my physician.    Witness signature: ___________________________________________________ Date:   ______/______/_____                    Time:  ________ A.M.  P.M.       Patient Name:  ______________________________________________________  (please print)      Patient signature:  ___________________________________________________             Relationship to Patient:           []  Parent    Responsible person                          []  Spouse  In case of minor or                    [] Other  _____________   Incompetent name:  __________________________________________________                               (please print)      _____________      Responsible person  In case of minor or  Incompetent signature:  _______________________________________________    Statement of Physician  My signature below affirms that prior to the time of the procedure, I have explained to the patient and/or his/her guardian, the risks and benefits involved in the proposed treatment and any reasonable alternative to the proposed treatment.  I have also explained the risks and benefits involved in the refusal of the proposed treatment and have answered the patient's questions.                        Date:  ______/______/_______  Provider                      Signature:  __________________________________________________________       Time:  ___________ A.M    P.M.

## (undated) NOTE — LETTER
VACCINE ADMINISTRATION RECORD  PARENT / GUARDIAN APPROVAL  Date: 2023  Vaccine administered to: Colt Tolbert     : 1990    MRN: [id number]    A copy of the appropriate Centers for Disease Control and Prevention Vaccine Information statement has been provided. I have read or have had explained the information about the diseases and the vaccines listed below. There was an opportunity to ask questions and any questions were answered satisfactorily. I believe that I understand the benefits and risks of the vaccine cited and ask that the vaccine(s) listed below be given to me or to the person named above (for whom I am authorized to make this request). VACCINES ADMINISTERED:  Tdap    I have read and hereby agree to be bound by the terms of this agreement as stated above. My signature is valid until revoked by me in writing. This document is signed by self, relationship: Self on 2023.:                                                                                                                                         Parent / Guardian Signature                                                Date    Ashley Leon served as a witness to authentication that the identity of the person signing electronically is in fact the person represented as signing.

## (undated) NOTE — LETTER
Dear New MomLaney, we missed you! The nurses of [facility] have tried to reach you by phone to ask if you have any questions regarding your health or the health and care of your new little one.    We hope you are doing well. If, for any reason, you have questions or concerns about your health or your baby’s health, please contact your provider or your pediatrician or family medicine physician regarding your baby.     At [facility], we feel that postpartum support is very important for new families. Please see the enclosed new parent support flyer that lists support programs and resources with both in-person and online options.     Additionally, our Breastfeeding Centers at [facility] and [facility] in [place], offer outpatient visits with our International Board-Certified Lactation   Consultants (IBCLCs) for any breastfeeding concerns or questions you may have.    For issues related to stress, anxiety or depression, we have a Nurturing Mom support group that meets both in-person or online.  There’s also a 24-hour Mom’s Line where you can request a phone call from a clinical therapist for assistance for postpartum depression.    We encourage you to take advantage of these programs and resources as you recover from childbirth and learn to care for your new infant.    Best wishes,    [facility]            [id number]